# Patient Record
Sex: MALE | Race: WHITE | NOT HISPANIC OR LATINO | Employment: UNEMPLOYED | URBAN - NONMETROPOLITAN AREA
[De-identification: names, ages, dates, MRNs, and addresses within clinical notes are randomized per-mention and may not be internally consistent; named-entity substitution may affect disease eponyms.]

---

## 2017-03-07 ENCOUNTER — HOSPITAL ENCOUNTER (EMERGENCY)
Facility: HOSPITAL | Age: 22
Discharge: LEFT BEFORE DISCHARGE | End: 2017-03-07

## 2017-03-07 VITALS
SYSTOLIC BLOOD PRESSURE: 125 MMHG | HEART RATE: 89 BPM | OXYGEN SATURATION: 100 % | HEIGHT: 69 IN | WEIGHT: 120 LBS | TEMPERATURE: 98 F | RESPIRATION RATE: 18 BRPM | DIASTOLIC BLOOD PRESSURE: 92 MMHG | BODY MASS INDEX: 17.77 KG/M2

## 2017-03-07 PROCEDURE — 99211 OFF/OP EST MAY X REQ PHY/QHP: CPT

## 2017-03-07 NOTE — ED NOTES
Pt notified staff that he did not wish to wait any longer and signed out without being seen.      Adelita Echevarria, VIC  03/07/17 2931

## 2017-04-25 ENCOUNTER — TRANSCRIBE ORDERS (OUTPATIENT)
Dept: ADMINISTRATIVE | Facility: HOSPITAL | Age: 22
End: 2017-04-25

## 2017-04-25 DIAGNOSIS — B18.2 CHRONIC HEPATITIS C WITHOUT HEPATIC COMA (HCC): Primary | ICD-10-CM

## 2017-05-02 ENCOUNTER — HOSPITAL ENCOUNTER (OUTPATIENT)
Dept: ULTRASOUND IMAGING | Facility: HOSPITAL | Age: 22
Discharge: HOME | End: 2017-05-02
Attending: INTERNAL MEDICINE | Admitting: INTERNAL MEDICINE

## 2017-05-02 DIAGNOSIS — B18.2 CHRONIC HEPATITIS C WITHOUT HEPATIC COMA (HCC): ICD-10-CM

## 2017-05-02 PROCEDURE — 76700 US EXAM ABDOM COMPLETE: CPT

## 2017-05-02 PROCEDURE — 76700 US EXAM ABDOM COMPLETE: CPT | Performed by: RADIOLOGY

## 2017-12-28 ENCOUNTER — HOSPITAL ENCOUNTER (EMERGENCY)
Facility: HOSPITAL | Age: 22
Discharge: PSYCHIATRIC OR SUBSTANCE USE FACILITY | End: 2017-12-28
Attending: EMERGENCY MEDICINE | Admitting: EMERGENCY MEDICINE

## 2017-12-28 ENCOUNTER — HOSPITAL ENCOUNTER (INPATIENT)
Facility: HOSPITAL | Age: 22
LOS: 1 days | Discharge: LEFT BEFORE DISCHARGE | End: 2017-12-28
Attending: PSYCHIATRY & NEUROLOGY | Admitting: PSYCHIATRY & NEUROLOGY

## 2017-12-28 VITALS
DIASTOLIC BLOOD PRESSURE: 74 MMHG | HEIGHT: 66 IN | HEART RATE: 108 BPM | RESPIRATION RATE: 18 BRPM | BODY MASS INDEX: 17.68 KG/M2 | OXYGEN SATURATION: 100 % | SYSTOLIC BLOOD PRESSURE: 116 MMHG | TEMPERATURE: 98.4 F | WEIGHT: 110 LBS

## 2017-12-28 VITALS
HEART RATE: 82 BPM | BODY MASS INDEX: 17.36 KG/M2 | WEIGHT: 108 LBS | RESPIRATION RATE: 18 BRPM | OXYGEN SATURATION: 99 % | DIASTOLIC BLOOD PRESSURE: 54 MMHG | HEIGHT: 66 IN | TEMPERATURE: 98.6 F | SYSTOLIC BLOOD PRESSURE: 98 MMHG

## 2017-12-28 DIAGNOSIS — F19.10 SUBSTANCE ABUSE (HCC): Primary | ICD-10-CM

## 2017-12-28 LAB
6-ACETYL MORPHINE: NEGATIVE
ALBUMIN SERPL-MCNC: 4.5 G/DL (ref 3.5–5)
ALBUMIN/GLOB SERPL: 1.3 G/DL (ref 1.5–2.5)
ALP SERPL-CCNC: 64 U/L (ref 40–129)
ALT SERPL W P-5'-P-CCNC: 97 U/L (ref 10–44)
AMPHET+METHAMPHET UR QL: POSITIVE
ANION GAP SERPL CALCULATED.3IONS-SCNC: 4.5 MMOL/L (ref 3.6–11.2)
AST SERPL-CCNC: 59 U/L (ref 10–34)
BARBITURATES UR QL SCN: NEGATIVE
BASOPHILS # BLD AUTO: 0.02 10*3/MM3 (ref 0–0.3)
BASOPHILS NFR BLD AUTO: 0.2 % (ref 0–2)
BENZODIAZ UR QL SCN: NEGATIVE
BILIRUB SERPL-MCNC: 1.6 MG/DL (ref 0.2–1.8)
BILIRUB UR QL STRIP: ABNORMAL
BUN BLD-MCNC: 15 MG/DL (ref 7–21)
BUN/CREAT SERPL: 19.7 (ref 7–25)
BUPRENORPHINE SERPL-MCNC: POSITIVE NG/ML
CALCIUM SPEC-SCNC: 10 MG/DL (ref 7.7–10)
CANNABINOIDS SERPL QL: NEGATIVE
CHLORIDE SERPL-SCNC: 107 MMOL/L (ref 99–112)
CLARITY UR: CLEAR
CO2 SERPL-SCNC: 26.5 MMOL/L (ref 24.3–31.9)
COCAINE UR QL: NEGATIVE
COLOR UR: ABNORMAL
CREAT BLD-MCNC: 0.76 MG/DL (ref 0.43–1.29)
DEPRECATED RDW RBC AUTO: 39.2 FL (ref 37–54)
EOSINOPHIL # BLD AUTO: 0.11 10*3/MM3 (ref 0–0.7)
EOSINOPHIL NFR BLD AUTO: 1.3 % (ref 0–5)
ERYTHROCYTE [DISTWIDTH] IN BLOOD BY AUTOMATED COUNT: 12.9 % (ref 11.5–14.5)
ETHANOL BLD-MCNC: <10 MG/DL
ETHANOL UR QL: <0.01 %
GFR SERPL CREATININE-BSD FRML MDRD: 129 ML/MIN/1.73
GLOBULIN UR ELPH-MCNC: 3.6 GM/DL
GLUCOSE BLD-MCNC: 106 MG/DL (ref 70–110)
GLUCOSE UR STRIP-MCNC: NEGATIVE MG/DL
HCT VFR BLD AUTO: 40.1 % (ref 42–52)
HGB BLD-MCNC: 13.5 G/DL (ref 14–18)
HGB UR QL STRIP.AUTO: NEGATIVE
IMM GRANULOCYTES # BLD: 0.01 10*3/MM3 (ref 0–0.03)
IMM GRANULOCYTES NFR BLD: 0.1 % (ref 0–0.5)
KETONES UR QL STRIP: ABNORMAL
LEUKOCYTE ESTERASE UR QL STRIP.AUTO: NEGATIVE
LYMPHOCYTES # BLD AUTO: 2.7 10*3/MM3 (ref 1–3)
LYMPHOCYTES NFR BLD AUTO: 31.2 % (ref 21–51)
MCH RBC QN AUTO: 28.4 PG (ref 27–33)
MCHC RBC AUTO-ENTMCNC: 33.7 G/DL (ref 33–37)
MCV RBC AUTO: 84.4 FL (ref 80–94)
METHADONE UR QL SCN: NEGATIVE
MONOCYTES # BLD AUTO: 0.78 10*3/MM3 (ref 0.1–0.9)
MONOCYTES NFR BLD AUTO: 9 % (ref 0–10)
NEUTROPHILS # BLD AUTO: 5.03 10*3/MM3 (ref 1.4–6.5)
NEUTROPHILS NFR BLD AUTO: 58.2 % (ref 30–70)
NITRITE UR QL STRIP: NEGATIVE
OPIATES UR QL: NEGATIVE
OSMOLALITY SERPL CALC.SUM OF ELEC: 276.9 MOSM/KG (ref 273–305)
OXYCODONE UR QL SCN: NEGATIVE
PCP UR QL SCN: NEGATIVE
PH UR STRIP.AUTO: <=5 [PH] (ref 5–8)
PLATELET # BLD AUTO: 314 10*3/MM3 (ref 130–400)
PMV BLD AUTO: 10.7 FL (ref 6–10)
POTASSIUM BLD-SCNC: 4.2 MMOL/L (ref 3.5–5.3)
PROT SERPL-MCNC: 8.1 G/DL (ref 6–8)
PROT UR QL STRIP: NEGATIVE
RBC # BLD AUTO: 4.75 10*6/MM3 (ref 4.7–6.1)
SODIUM BLD-SCNC: 138 MMOL/L (ref 135–153)
SP GR UR STRIP: >1.03 (ref 1–1.03)
UROBILINOGEN UR QL STRIP: ABNORMAL
WBC NRBC COR # BLD: 8.65 10*3/MM3 (ref 4.5–12.5)

## 2017-12-28 PROCEDURE — 93005 ELECTROCARDIOGRAM TRACING: CPT | Performed by: PSYCHIATRY & NEUROLOGY

## 2017-12-28 PROCEDURE — 80307 DRUG TEST PRSMV CHEM ANLYZR: CPT | Performed by: PHYSICIAN ASSISTANT

## 2017-12-28 PROCEDURE — 93010 ELECTROCARDIOGRAM REPORT: CPT | Performed by: INTERNAL MEDICINE

## 2017-12-28 PROCEDURE — 80053 COMPREHEN METABOLIC PANEL: CPT | Performed by: PHYSICIAN ASSISTANT

## 2017-12-28 PROCEDURE — 99284 EMERGENCY DEPT VISIT MOD MDM: CPT

## 2017-12-28 PROCEDURE — 81003 URINALYSIS AUTO W/O SCOPE: CPT | Performed by: PHYSICIAN ASSISTANT

## 2017-12-28 PROCEDURE — 85025 COMPLETE CBC W/AUTO DIFF WBC: CPT | Performed by: PHYSICIAN ASSISTANT

## 2017-12-28 RX ORDER — LOPERAMIDE HYDROCHLORIDE 2 MG/1
2 CAPSULE ORAL 4 TIMES DAILY PRN
Status: DISCONTINUED | OUTPATIENT
Start: 2017-12-28 | End: 2017-12-29 | Stop reason: HOSPADM

## 2017-12-28 RX ORDER — CLONIDINE HYDROCHLORIDE 0.1 MG/1
0.1 TABLET ORAL 3 TIMES DAILY PRN
Status: DISCONTINUED | OUTPATIENT
Start: 2017-12-30 | End: 2017-12-29 | Stop reason: HOSPADM

## 2017-12-28 RX ORDER — ALUMINA, MAGNESIA, AND SIMETHICONE 2400; 2400; 240 MG/30ML; MG/30ML; MG/30ML
15 SUSPENSION ORAL EVERY 6 HOURS PRN
Status: DISCONTINUED | OUTPATIENT
Start: 2017-12-28 | End: 2017-12-29 | Stop reason: HOSPADM

## 2017-12-28 RX ORDER — NICOTINE 21 MG/24HR
1 PATCH, TRANSDERMAL 24 HOURS TRANSDERMAL DAILY
Status: DISCONTINUED | OUTPATIENT
Start: 2017-12-28 | End: 2017-12-29 | Stop reason: HOSPADM

## 2017-12-28 RX ORDER — CYCLOBENZAPRINE HCL 10 MG
10 TABLET ORAL 3 TIMES DAILY PRN
Status: DISCONTINUED | OUTPATIENT
Start: 2017-12-28 | End: 2017-12-29 | Stop reason: HOSPADM

## 2017-12-28 RX ORDER — TRAZODONE HYDROCHLORIDE 50 MG/1
50 TABLET ORAL NIGHTLY PRN
Status: DISCONTINUED | OUTPATIENT
Start: 2017-12-28 | End: 2017-12-29 | Stop reason: HOSPADM

## 2017-12-28 RX ORDER — CLONIDINE HYDROCHLORIDE 0.1 MG/1
0.1 TABLET ORAL ONCE AS NEEDED
Status: DISCONTINUED | OUTPATIENT
Start: 2018-01-01 | End: 2017-12-29 | Stop reason: HOSPADM

## 2017-12-28 RX ORDER — CLONIDINE HYDROCHLORIDE 0.1 MG/1
0.1 TABLET ORAL 4 TIMES DAILY PRN
Status: DISCONTINUED | OUTPATIENT
Start: 2017-12-29 | End: 2017-12-29 | Stop reason: HOSPADM

## 2017-12-28 RX ORDER — IBUPROFEN 600 MG/1
600 TABLET ORAL EVERY 6 HOURS PRN
Status: DISCONTINUED | OUTPATIENT
Start: 2017-12-28 | End: 2017-12-29 | Stop reason: HOSPADM

## 2017-12-28 RX ORDER — ECHINACEA PURPUREA EXTRACT 125 MG
2 TABLET ORAL AS NEEDED
Status: DISCONTINUED | OUTPATIENT
Start: 2017-12-28 | End: 2017-12-29 | Stop reason: HOSPADM

## 2017-12-28 RX ORDER — CLONIDINE HYDROCHLORIDE 0.1 MG/1
0.1 TABLET ORAL 2 TIMES DAILY PRN
Status: DISCONTINUED | OUTPATIENT
Start: 2017-12-31 | End: 2017-12-29 | Stop reason: HOSPADM

## 2017-12-28 RX ORDER — HYDROXYZINE 50 MG/1
50 TABLET, FILM COATED ORAL EVERY 4 HOURS PRN
Status: DISCONTINUED | OUTPATIENT
Start: 2017-12-28 | End: 2017-12-29 | Stop reason: HOSPADM

## 2017-12-28 RX ORDER — CLONIDINE HYDROCHLORIDE 0.1 MG/1
0.1 TABLET ORAL 4 TIMES DAILY PRN
Status: DISCONTINUED | OUTPATIENT
Start: 2017-12-28 | End: 2017-12-28

## 2017-12-28 RX ORDER — DICYCLOMINE HYDROCHLORIDE 10 MG/1
10 CAPSULE ORAL 3 TIMES DAILY PRN
Status: DISCONTINUED | OUTPATIENT
Start: 2017-12-28 | End: 2017-12-29 | Stop reason: HOSPADM

## 2017-12-28 RX ORDER — CLONIDINE HYDROCHLORIDE 0.1 MG/1
0.1 TABLET ORAL 4 TIMES DAILY PRN
Status: DISCONTINUED | OUTPATIENT
Start: 2017-12-28 | End: 2017-12-29 | Stop reason: HOSPADM

## 2017-12-28 RX ORDER — FAMOTIDINE 20 MG/1
20 TABLET, FILM COATED ORAL 2 TIMES DAILY PRN
Status: DISCONTINUED | OUTPATIENT
Start: 2017-12-28 | End: 2017-12-29 | Stop reason: HOSPADM

## 2017-12-28 RX ORDER — ONDANSETRON 4 MG/1
4 TABLET, FILM COATED ORAL EVERY 6 HOURS PRN
Status: DISCONTINUED | OUTPATIENT
Start: 2017-12-28 | End: 2017-12-29 | Stop reason: HOSPADM

## 2017-12-28 RX ORDER — BENZONATATE 100 MG/1
100 CAPSULE ORAL 3 TIMES DAILY PRN
Status: DISCONTINUED | OUTPATIENT
Start: 2017-12-28 | End: 2017-12-29 | Stop reason: HOSPADM

## 2018-01-05 NOTE — H&P
INITIAL PSYCHIATRIC HISTORY & PHYSICAL    Patient Identification:  Name:  Korey Sierra  Age:  22 y.o.  Sex:  male  :  1995  MRN:  [id number]   Visit Number:  [id number]  Primary Care Physician:  DEMARCO Ruiz    SUBJECTIVE    CC: withdrawals    HPI: Korey Sierra is a 22 y.o. male who was admitted on 2017 with complaints of withdrawals from opioids and was using Suboxone interavenously. He was admitted to the detox recovery unit and started on Clonidine detox. He decided to leave the hospital prior to being seen by the psychiatrist and Dr. Kendall Snow was on call and he gave orders for his discharge.      Past Medical History:   Diagnosis Date   • ADHD (attention deficit hyperactivity disorder)    • Hepatitis C    • Substance abuse    • Withdrawal symptoms, drug or narcotic           Past Surgical History:   Procedure Laterality Date   • NO PAST SURGERIES         Family History   Problem Relation Age of Onset   • Drug abuse Paternal Uncle          No prescriptions prior to admission.         ALLERGIES:  Review of patient's allergies indicates no known allergies.         REVIEW OF SYSTEMS:  Review of Systems   Not done, please see ER notes.  OBJECTIVE    PHYSICAL EXAM:  Physical Exam   Not done, please see ER notes.    MENTAL STATUS EXAM:    Not done, please see ER notes.      Imaging Results (last 24 hours)     ** No results found for the last 24 hours. **           ECG/EMG Results (most recent)     Procedure Component Value Units Date/Time    ECG 12 Lead [[id number]] Collected:  17 1608     Updated:  17    Narrative:       Test Reason : Potential adverse reaction to medications.  Blood Pressure : **/** mmHG  Vent. Rate : 065 BPM     Atrial Rate : 065 BPM     P-R Int : 122 ms          QRS Dur : 110 ms      QT Int : 416 ms       P-R-T Axes : 044 064 038 degrees     QTc Int : 432 ms    Normal sinus rhythm with sinus arrhythmia  RSR' or QR pattern in V1 suggests right  ventricular conduction delay  Borderline ECG  No previous ECGs available  Confirmed by Joshua Shelton ([id number]) on 12/28/2017 10:19:30 PM    Referred By:  DEBORAH           Confirmed By:Joshua Shelton           Lab Results   Component Value Date    GLUCOSE 106 12/28/2017    BUN 15 12/28/2017    CREATININE 0.76 12/28/2017    EGFRIFNONA 129 12/28/2017    BCR 19.7 12/28/2017    CO2 26.5 12/28/2017    CALCIUM 10.0 12/28/2017    ALBUMIN 4.50 12/28/2017    LABIL2 1.3 (L) 12/28/2017    AST 59 (H) 12/28/2017    ALT 97 (H) 12/28/2017       Lab Results   Component Value Date    WBC 8.65 12/28/2017    HGB 13.5 (L) 12/28/2017    HCT 40.1 (L) 12/28/2017    MCV 84.4 12/28/2017     12/28/2017       Last Urine Toxicity     LAST URINE TOXICITY RESULTS Latest Ref Rng & Units 12/28/2017 3/21/2015    AMPHETAMINES SCREEN, URINE Negative Positive(A) Negative    BARBITURATES SCREEN Negative Negative Negative    BENZODIAZEPINE SCREEN, URINE Negative Negative Negative    BUPRENORPHINE Negative Positive(A) -    COCAINE SCREEN, URINE Negative Negative Negative    METHADONE SCREEN, URINE Negative Negative Negative          Brief Urine Lab Results  (Last result in the past 365 days)      Color   Clarity   Blood   Leuk Est   Nitrite   Protein   CREAT   Urine HCG        12/28/17 1501 Dark Yellow(A) Clear Negative Negative Negative Negative                   ASSESSMENT & PLAN:      Patient Active Problem List   Diagnosis Code   • Polysubstance abuse F19.10         The patient was admitted for detox treatment and was started on Clonidine detox but shortly afterwards he decided to leave and purse his treatment somewhere else and wanted to leave [place] and was discharged by Dr. Kendall Snow.

## 2018-01-17 NOTE — DISCHARGE SUMMARY
Date of Discharge:  1/16/2018    Discharge Diagnosis:Active Problems:    Polysubstance abuse        Presenting Problem/History of Present Illness:  22 y.o. male who was admitted on 12/28/2017 with complaints of withdrawals from opioids and was using Suboxone interavenously.     Hospital Course:  Patient was admitted for safety and stabilization and was placed on standard precautions.  Routine labs were checked.  Patient was assigned a masters level therapist and provided with an opportunity to participate in group and individual therapy on the unit.  Unfortunately, patient decided to leave the hospital within hours of being admitted.   At that time his vitals were stable. He was not suicidal or homicidal. He did not meet criteria for forced hospitalization against his will and left against medical advise.    Consults:   Consults     No orders found from 11/29/2017 to 12/29/2017.          Labs:  Lab Results (all)     None          Imaging:  Imaging Results (all)     None                  Condition on Discharge: gaurded      Discharge Disposition  Left Against Medical Advice    Discharge Medications  There are no discharge medications for this patient.       Discharge Diet: regular    Activity at Discharge: as tolerated    Follow-up Appointments:    No future appointments.      Kendall Snow MD  01/16/18  7:38 PM

## 2018-06-10 ENCOUNTER — HOSPITAL ENCOUNTER (EMERGENCY)
Facility: HOSPITAL | Age: 23
Discharge: HOME | End: 2018-06-10
Attending: FAMILY MEDICINE | Admitting: FAMILY MEDICINE

## 2018-06-10 ENCOUNTER — APPOINTMENT (OUTPATIENT)
Dept: CT IMAGING | Facility: HOSPITAL | Age: 23
End: 2018-06-10

## 2018-06-10 VITALS
BODY MASS INDEX: 19.7 KG/M2 | HEART RATE: 75 BPM | DIASTOLIC BLOOD PRESSURE: 55 MMHG | OXYGEN SATURATION: 100 % | SYSTOLIC BLOOD PRESSURE: 105 MMHG | TEMPERATURE: 98.2 F | WEIGHT: 130 LBS | HEIGHT: 68 IN | RESPIRATION RATE: 20 BRPM

## 2018-06-10 DIAGNOSIS — R56.9 SEIZURE (HCC): Primary | ICD-10-CM

## 2018-06-10 DIAGNOSIS — H70.91 MASTOIDITIS OF RIGHT SIDE: ICD-10-CM

## 2018-06-10 LAB
6-ACETYL MORPHINE: NEGATIVE
ALBUMIN SERPL-MCNC: 5.1 G/DL (ref 3.5–5)
ALBUMIN/GLOB SERPL: 1.4 G/DL (ref 1.5–2.5)
ALP SERPL-CCNC: 58 U/L (ref 40–129)
ALT SERPL W P-5'-P-CCNC: 128 U/L (ref 10–44)
AMPHET+METHAMPHET UR QL: POSITIVE
ANION GAP SERPL CALCULATED.3IONS-SCNC: 11.5 MMOL/L (ref 3.6–11.2)
AST SERPL-CCNC: 80 U/L (ref 10–34)
BARBITURATES UR QL SCN: NEGATIVE
BASOPHILS # BLD AUTO: 0.01 10*3/MM3 (ref 0–0.3)
BASOPHILS NFR BLD AUTO: 0.1 % (ref 0–2)
BENZODIAZ UR QL SCN: NEGATIVE
BILIRUB SERPL-MCNC: 0.6 MG/DL (ref 0.2–1.8)
BILIRUB UR QL STRIP: NEGATIVE
BUN BLD-MCNC: 6 MG/DL (ref 7–21)
BUN/CREAT SERPL: 6.7 (ref 7–25)
BUPRENORPHINE SERPL-MCNC: NEGATIVE NG/ML
CALCIUM SPEC-SCNC: 10 MG/DL (ref 7.7–10)
CANNABINOIDS SERPL QL: NEGATIVE
CHLORIDE SERPL-SCNC: 106 MMOL/L (ref 99–112)
CLARITY UR: ABNORMAL
CO2 SERPL-SCNC: 21.5 MMOL/L (ref 24.3–31.9)
COCAINE UR QL: NEGATIVE
COLOR UR: YELLOW
CREAT BLD-MCNC: 0.9 MG/DL (ref 0.43–1.29)
DEPRECATED RDW RBC AUTO: 41.7 FL (ref 37–54)
EOSINOPHIL # BLD AUTO: 0 10*3/MM3 (ref 0–0.7)
EOSINOPHIL NFR BLD AUTO: 0 % (ref 0–5)
ERYTHROCYTE [DISTWIDTH] IN BLOOD BY AUTOMATED COUNT: 13.6 % (ref 11.5–14.5)
ETHANOL BLD-MCNC: <10 MG/DL
ETHANOL UR QL: <0.01 %
GFR SERPL CREATININE-BSD FRML MDRD: 107 ML/MIN/1.73
GLOBULIN UR ELPH-MCNC: 3.7 GM/DL
GLUCOSE BLD-MCNC: 107 MG/DL (ref 70–110)
GLUCOSE UR STRIP-MCNC: NEGATIVE MG/DL
HCT VFR BLD AUTO: 43.1 % (ref 42–52)
HGB BLD-MCNC: 14.6 G/DL (ref 14–18)
HGB UR QL STRIP.AUTO: NEGATIVE
IMM GRANULOCYTES # BLD: 0.05 10*3/MM3 (ref 0–0.03)
IMM GRANULOCYTES NFR BLD: 0.4 % (ref 0–0.5)
KETONES UR QL STRIP: ABNORMAL
LEUKOCYTE ESTERASE UR QL STRIP.AUTO: NEGATIVE
LYMPHOCYTES # BLD AUTO: 1.53 10*3/MM3 (ref 1–3)
LYMPHOCYTES NFR BLD AUTO: 13.4 % (ref 21–51)
MCH RBC QN AUTO: 28.9 PG (ref 27–33)
MCHC RBC AUTO-ENTMCNC: 33.9 G/DL (ref 33–37)
MCV RBC AUTO: 85.3 FL (ref 80–94)
METHADONE UR QL SCN: POSITIVE
MONOCYTES # BLD AUTO: 0.47 10*3/MM3 (ref 0.1–0.9)
MONOCYTES NFR BLD AUTO: 4.1 % (ref 0–10)
NEUTROPHILS # BLD AUTO: 9.34 10*3/MM3 (ref 1.4–6.5)
NEUTROPHILS NFR BLD AUTO: 82 % (ref 30–70)
NITRITE UR QL STRIP: NEGATIVE
OPIATES UR QL: NEGATIVE
OSMOLALITY SERPL CALC.SUM OF ELEC: 275.6 MOSM/KG (ref 273–305)
OXYCODONE UR QL SCN: NEGATIVE
PCP UR QL SCN: NEGATIVE
PH UR STRIP.AUTO: <=5 [PH] (ref 5–8)
PLATELET # BLD AUTO: 289 10*3/MM3 (ref 130–400)
PMV BLD AUTO: 12.1 FL (ref 6–10)
POTASSIUM BLD-SCNC: 5.3 MMOL/L (ref 3.5–5.3)
PROT SERPL-MCNC: 8.8 G/DL (ref 6–8)
PROT UR QL STRIP: NEGATIVE
RBC # BLD AUTO: 5.05 10*6/MM3 (ref 4.7–6.1)
SODIUM BLD-SCNC: 139 MMOL/L (ref 135–153)
SP GR UR STRIP: 1.02 (ref 1–1.03)
UROBILINOGEN UR QL STRIP: ABNORMAL
WBC NRBC COR # BLD: 11.4 10*3/MM3 (ref 4.5–12.5)

## 2018-06-10 PROCEDURE — 70486 CT MAXILLOFACIAL W/O DYE: CPT | Performed by: RADIOLOGY

## 2018-06-10 PROCEDURE — 80307 DRUG TEST PRSMV CHEM ANLYZR: CPT | Performed by: PHYSICIAN ASSISTANT

## 2018-06-10 PROCEDURE — 99285 EMERGENCY DEPT VISIT HI MDM: CPT

## 2018-06-10 PROCEDURE — 96374 THER/PROPH/DIAG INJ IV PUSH: CPT

## 2018-06-10 PROCEDURE — 25010000002 LORAZEPAM PER 2 MG: Performed by: FAMILY MEDICINE

## 2018-06-10 PROCEDURE — 70450 CT HEAD/BRAIN W/O DYE: CPT | Performed by: RADIOLOGY

## 2018-06-10 PROCEDURE — 81003 URINALYSIS AUTO W/O SCOPE: CPT | Performed by: PHYSICIAN ASSISTANT

## 2018-06-10 PROCEDURE — 70450 CT HEAD/BRAIN W/O DYE: CPT

## 2018-06-10 PROCEDURE — 70486 CT MAXILLOFACIAL W/O DYE: CPT

## 2018-06-10 PROCEDURE — 85025 COMPLETE CBC W/AUTO DIFF WBC: CPT | Performed by: PHYSICIAN ASSISTANT

## 2018-06-10 PROCEDURE — 80053 COMPREHEN METABOLIC PANEL: CPT | Performed by: PHYSICIAN ASSISTANT

## 2018-06-10 RX ORDER — SODIUM CHLORIDE 0.9 % (FLUSH) 0.9 %
10 SYRINGE (ML) INJECTION AS NEEDED
Status: DISCONTINUED | OUTPATIENT
Start: 2018-06-10 | End: 2018-06-10 | Stop reason: HOSPADM

## 2018-06-10 RX ORDER — LORAZEPAM 2 MG/ML
1 INJECTION INTRAMUSCULAR ONCE
Status: COMPLETED | OUTPATIENT
Start: 2018-06-10 | End: 2018-06-10

## 2018-06-10 RX ORDER — AMOXICILLIN AND CLAVULANATE POTASSIUM 875; 125 MG/1; MG/1
1 TABLET, FILM COATED ORAL EVERY 12 HOURS SCHEDULED
Qty: 20 TABLET | Refills: 0 | Status: ON HOLD | OUTPATIENT
Start: 2018-06-10 | End: 2018-11-27

## 2018-06-10 RX ADMIN — LORAZEPAM 1 MG: 2 INJECTION INTRAMUSCULAR; INTRAVENOUS at 08:28

## 2018-06-10 NOTE — ED PROVIDER NOTES
"Subjective   Patient reports that he has been abusing Neurontin recently.  He took 18 yesterday though states he takes several each day.  He is not taking any today.  He also missed going to the methadone clinic yesterday        Seizures   Seizure activity on arrival: no    Seizure type:  Grand mal  Initial focality:  Diffuse  Episode characteristics: disorientation, generalized shaking and unresponsiveness    Return to baseline: yes    Severity:  Severe  Duration:  5 minutes  Timing:  Once  Number of seizures this episode:  1  Progression:  Resolved  Context comment:  Patient's mother reports that she heard a banging noise.  When she arrived into the kitchen her son and fallen onto the floor and was jerking all over.  He also had \"foam\" coming from his mouth.  He did strike the left side of his head.    PTA treatment:  None  History of seizures: no        Review of Systems   Constitutional: Negative.  Negative for fever.   HENT: Negative.    Respiratory: Negative.    Cardiovascular: Negative.  Negative for chest pain.   Gastrointestinal: Negative.  Negative for abdominal pain.   Endocrine: Negative.    Genitourinary: Negative.  Negative for dysuria.   Skin: Negative.    Neurological: Positive for seizures.   Psychiatric/Behavioral: Negative.    All other systems reviewed and are negative.      Past Medical History:   Diagnosis Date   • ADHD (attention deficit hyperactivity disorder)    • Hepatitis C    • Substance abuse    • Withdrawal symptoms, drug or narcotic        No Known Allergies    Past Surgical History:   Procedure Laterality Date   • NO PAST SURGERIES         Family History   Problem Relation Age of Onset   • Drug abuse Paternal Uncle        Social History     Social History   • Marital status: Single     Social History Main Topics   • Smoking status: Current Every Day Smoker     Packs/day: 1.00     Years: 9.00     Types: Cigarettes   • Smokeless tobacco: Never Used   • Alcohol use No   • Drug use: Yes "      Comment: suboxone   • Sexual activity: Defer     Other Topics Concern   • Not on file           Objective   Physical Exam   Constitutional: He is oriented to person, place, and time. He appears well-developed and well-nourished. No distress.   HENT:   Head: Normocephalic and atraumatic.   Right Ear: External ear normal.   Left Ear: External ear normal.   Nose: Nose normal.   Eyes: Conjunctivae and EOM are normal. Pupils are equal, round, and reactive to light.   Neck: Normal range of motion. Neck supple. No JVD present. No tracheal deviation present.   Cardiovascular: Normal rate, regular rhythm and normal heart sounds.    No murmur heard.  Pulmonary/Chest: Effort normal and breath sounds normal. No respiratory distress. He has no wheezes.   Abdominal: Soft. Bowel sounds are normal. There is no tenderness.   Musculoskeletal: Normal range of motion. He exhibits no edema or deformity.   Neurological: He is alert and oriented to person, place, and time. No cranial nerve deficit.   Patient does have occasional twitching in both arms and face.  He has equal  and strength.   Skin: Skin is warm and dry. No rash noted. He is not diaphoretic. No erythema. No pallor.   Patient has some abrasions to the left side of his face.    Psychiatric: He has a normal mood and affect. His behavior is normal. Thought content normal.   Nursing note and vitals reviewed.      Procedures           ED Course  ED Course as of Carlos 10 1026   Sun Carlos 10, 2018   0950 Patient's twitching has resolved.  He is alert and oriented ×3.  He was counseled about his CT findings of potential mastoiditis.  He denies any recent sinus issues.  No ear pain.  His postauricular region is normal nontender not red.  His ear exam is also normal.  He was counseled about the need to stop using Neurontin.  His amphetamine screen was also positive.  He states that he's not been using amphetamine.  He does request a refill for methadone and is concerned about  smoking.  He's been counseled about signs and symptoms worsening along with appropriate follow-up.  He is alert and oriented ×3 at this time.  No distress. D/w Dr Hills- agrees with plan  [JI]      ED Course User Index  [JI] ERIK Salazar                  MDM  Number of Diagnoses or Management Options  Mastoiditis of right side: new and requires workup  Seizure: new and requires workup     Amount and/or Complexity of Data Reviewed  Clinical lab tests: reviewed and ordered  Tests in the radiology section of CPT®: reviewed and ordered  Discuss the patient with other providers: yes    Risk of Complications, Morbidity, and/or Mortality  Presenting problems: moderate          Final diagnoses:   Seizure   Mastoiditis of right side            ERIK Salazar  06/10/18 1026

## 2018-11-27 ENCOUNTER — HOSPITAL ENCOUNTER (INPATIENT)
Facility: HOSPITAL | Age: 23
LOS: 6 days | Discharge: HOME | End: 2018-12-03
Attending: PSYCHIATRY & NEUROLOGY | Admitting: PSYCHIATRY & NEUROLOGY

## 2018-11-27 ENCOUNTER — HOSPITAL ENCOUNTER (EMERGENCY)
Facility: HOSPITAL | Age: 23
Discharge: STILL ADMITTED OR ADMINISTRATIVE | End: 2018-11-27
Attending: GENERAL ACUTE CARE HOSPITAL

## 2018-11-27 VITALS
RESPIRATION RATE: 16 BRPM | OXYGEN SATURATION: 99 % | WEIGHT: 126 LBS | BODY MASS INDEX: 19.1 KG/M2 | HEART RATE: 82 BPM | HEIGHT: 68 IN | DIASTOLIC BLOOD PRESSURE: 60 MMHG | SYSTOLIC BLOOD PRESSURE: 115 MMHG | TEMPERATURE: 97.7 F

## 2018-11-27 DIAGNOSIS — F19.10 SUBSTANCE ABUSE (HCC): Primary | ICD-10-CM

## 2018-11-27 LAB
6-ACETYL MORPHINE: NEGATIVE
ALBUMIN SERPL-MCNC: 4.1 G/DL (ref 3.5–5)
ALBUMIN/GLOB SERPL: 1.2 G/DL (ref 1.5–2.5)
ALP SERPL-CCNC: 72 U/L (ref 40–129)
ALT SERPL W P-5'-P-CCNC: 63 U/L (ref 10–44)
AMPHET+METHAMPHET UR QL: POSITIVE
ANION GAP SERPL CALCULATED.3IONS-SCNC: 5.6 MMOL/L (ref 3.6–11.2)
AST SERPL-CCNC: 38 U/L (ref 10–34)
BARBITURATES UR QL SCN: NEGATIVE
BASOPHILS # BLD AUTO: 0.03 10*3/MM3 (ref 0–0.3)
BASOPHILS NFR BLD AUTO: 0.4 % (ref 0–2)
BENZODIAZ UR QL SCN: NEGATIVE
BILIRUB SERPL-MCNC: 0.5 MG/DL (ref 0.2–1.8)
BILIRUB UR QL STRIP: NEGATIVE
BUN BLD-MCNC: 7 MG/DL (ref 7–21)
BUN/CREAT SERPL: 11.1 (ref 7–25)
BUPRENORPHINE SERPL-MCNC: POSITIVE NG/ML
CALCIUM SPEC-SCNC: 9.6 MG/DL (ref 7.7–10)
CANNABINOIDS SERPL QL: NEGATIVE
CHLORIDE SERPL-SCNC: 106 MMOL/L (ref 99–112)
CLARITY UR: CLEAR
CO2 SERPL-SCNC: 28.4 MMOL/L (ref 24.3–31.9)
COCAINE UR QL: NEGATIVE
COLOR UR: YELLOW
CREAT BLD-MCNC: 0.63 MG/DL (ref 0.43–1.29)
DEPRECATED RDW RBC AUTO: 38.8 FL (ref 37–54)
EOSINOPHIL # BLD AUTO: 0.11 10*3/MM3 (ref 0–0.7)
EOSINOPHIL NFR BLD AUTO: 1.5 % (ref 0–5)
ERYTHROCYTE [DISTWIDTH] IN BLOOD BY AUTOMATED COUNT: 12.6 % (ref 11.5–14.5)
ETHANOL BLD-MCNC: <10 MG/DL
ETHANOL UR QL: <0.01 %
GFR SERPL CREATININE-BSD FRML MDRD: >150 ML/MIN/1.73
GLOBULIN UR ELPH-MCNC: 3.5 GM/DL
GLUCOSE BLD-MCNC: 107 MG/DL (ref 70–110)
GLUCOSE UR STRIP-MCNC: NEGATIVE MG/DL
HCT VFR BLD AUTO: 40.8 % (ref 42–52)
HGB BLD-MCNC: 13.8 G/DL (ref 14–18)
HGB UR QL STRIP.AUTO: NEGATIVE
IMM GRANULOCYTES # BLD: 0.01 10*3/MM3 (ref 0–0.03)
IMM GRANULOCYTES NFR BLD: 0.1 % (ref 0–0.5)
KETONES UR QL STRIP: ABNORMAL
LEUKOCYTE ESTERASE UR QL STRIP.AUTO: NEGATIVE
LYMPHOCYTES # BLD AUTO: 3.47 10*3/MM3 (ref 1–3)
LYMPHOCYTES NFR BLD AUTO: 46.1 % (ref 21–51)
MCH RBC QN AUTO: 28.8 PG (ref 27–33)
MCHC RBC AUTO-ENTMCNC: 33.8 G/DL (ref 33–37)
MCV RBC AUTO: 85 FL (ref 80–94)
METHADONE UR QL SCN: NEGATIVE
MONOCYTES # BLD AUTO: 0.53 10*3/MM3 (ref 0.1–0.9)
MONOCYTES NFR BLD AUTO: 7 % (ref 0–10)
NEUTROPHILS # BLD AUTO: 3.37 10*3/MM3 (ref 1.4–6.5)
NEUTROPHILS NFR BLD AUTO: 44.9 % (ref 30–70)
NITRITE UR QL STRIP: NEGATIVE
OPIATES UR QL: NEGATIVE
OSMOLALITY SERPL CALC.SUM OF ELEC: 277.8 MOSM/KG (ref 273–305)
OXYCODONE UR QL SCN: NEGATIVE
PCP UR QL SCN: NEGATIVE
PH UR STRIP.AUTO: 6.5 [PH] (ref 5–8)
PLATELET # BLD AUTO: 306 10*3/MM3 (ref 130–400)
PMV BLD AUTO: 10.6 FL (ref 6–10)
POTASSIUM BLD-SCNC: 4 MMOL/L (ref 3.5–5.3)
PROT SERPL-MCNC: 7.6 G/DL (ref 6–8)
PROT UR QL STRIP: NEGATIVE
RBC # BLD AUTO: 4.8 10*6/MM3 (ref 4.7–6.1)
SODIUM BLD-SCNC: 140 MMOL/L (ref 135–153)
SP GR UR STRIP: 1.02 (ref 1–1.03)
UROBILINOGEN UR QL STRIP: ABNORMAL
WBC NRBC COR # BLD: 7.52 10*3/MM3 (ref 4.5–12.5)

## 2018-11-27 PROCEDURE — 80307 DRUG TEST PRSMV CHEM ANLYZR: CPT | Performed by: PHYSICIAN ASSISTANT

## 2018-11-27 PROCEDURE — 81003 URINALYSIS AUTO W/O SCOPE: CPT | Performed by: PHYSICIAN ASSISTANT

## 2018-11-27 PROCEDURE — 93010 ELECTROCARDIOGRAM REPORT: CPT | Performed by: INTERNAL MEDICINE

## 2018-11-27 PROCEDURE — HZ2ZZZZ DETOXIFICATION SERVICES FOR SUBSTANCE ABUSE TREATMENT: ICD-10-PCS | Performed by: PSYCHIATRY & NEUROLOGY

## 2018-11-27 PROCEDURE — 80053 COMPREHEN METABOLIC PANEL: CPT | Performed by: PHYSICIAN ASSISTANT

## 2018-11-27 PROCEDURE — 93005 ELECTROCARDIOGRAM TRACING: CPT | Performed by: PSYCHIATRY & NEUROLOGY

## 2018-11-27 PROCEDURE — 85025 COMPLETE CBC W/AUTO DIFF WBC: CPT | Performed by: PHYSICIAN ASSISTANT

## 2018-11-27 RX ORDER — CLONIDINE HYDROCHLORIDE 0.1 MG/1
0.1 TABLET ORAL 3 TIMES DAILY PRN
Status: ACTIVE | OUTPATIENT
Start: 2018-11-29 | End: 2018-11-30

## 2018-11-27 RX ORDER — DICYCLOMINE HYDROCHLORIDE 10 MG/1
10 CAPSULE ORAL 3 TIMES DAILY PRN
Status: DISPENSED | OUTPATIENT
Start: 2018-11-27 | End: 2018-12-02

## 2018-11-27 RX ORDER — CLONIDINE HYDROCHLORIDE 0.1 MG/1
0.1 TABLET ORAL 4 TIMES DAILY PRN
Status: ACTIVE | OUTPATIENT
Start: 2018-11-27 | End: 2018-11-28

## 2018-11-27 RX ORDER — BENZTROPINE MESYLATE 1 MG/1
1 TABLET ORAL DAILY PRN
Status: DISCONTINUED | OUTPATIENT
Start: 2018-11-27 | End: 2018-12-03 | Stop reason: HOSPADM

## 2018-11-27 RX ORDER — CYCLOBENZAPRINE HCL 10 MG
10 TABLET ORAL 3 TIMES DAILY PRN
Status: DISPENSED | OUTPATIENT
Start: 2018-11-27 | End: 2018-12-02

## 2018-11-27 RX ORDER — TRAZODONE HYDROCHLORIDE 50 MG/1
50 TABLET ORAL NIGHTLY PRN
Status: DISCONTINUED | OUTPATIENT
Start: 2018-11-27 | End: 2018-12-03 | Stop reason: HOSPADM

## 2018-11-27 RX ORDER — ECHINACEA PURPUREA EXTRACT 125 MG
2 TABLET ORAL AS NEEDED
Status: DISCONTINUED | OUTPATIENT
Start: 2018-11-27 | End: 2018-12-03 | Stop reason: HOSPADM

## 2018-11-27 RX ORDER — ONDANSETRON 4 MG/1
4 TABLET, FILM COATED ORAL 3 TIMES DAILY PRN
Status: DISPENSED | OUTPATIENT
Start: 2018-11-27 | End: 2018-12-02

## 2018-11-27 RX ORDER — BENZONATATE 100 MG/1
100 CAPSULE ORAL 3 TIMES DAILY PRN
Status: DISCONTINUED | OUTPATIENT
Start: 2018-11-27 | End: 2018-12-03 | Stop reason: HOSPADM

## 2018-11-27 RX ORDER — HYDROXYZINE 50 MG/1
50 TABLET, FILM COATED ORAL 3 TIMES DAILY PRN
Status: DISPENSED | OUTPATIENT
Start: 2018-11-27 | End: 2018-12-02

## 2018-11-27 RX ORDER — ALUMINA, MAGNESIA, AND SIMETHICONE 2400; 2400; 240 MG/30ML; MG/30ML; MG/30ML
15 SUSPENSION ORAL EVERY 6 HOURS PRN
Status: DISCONTINUED | OUTPATIENT
Start: 2018-11-27 | End: 2018-12-03 | Stop reason: HOSPADM

## 2018-11-27 RX ORDER — LOPERAMIDE HYDROCHLORIDE 2 MG/1
2 CAPSULE ORAL 4 TIMES DAILY PRN
Status: DISCONTINUED | OUTPATIENT
Start: 2018-11-27 | End: 2018-12-03 | Stop reason: HOSPADM

## 2018-11-27 RX ORDER — CLONIDINE HYDROCHLORIDE 0.1 MG/1
0.1 TABLET ORAL 4 TIMES DAILY PRN
Status: ACTIVE | OUTPATIENT
Start: 2018-11-28 | End: 2018-11-29

## 2018-11-27 RX ORDER — FAMOTIDINE 20 MG/1
20 TABLET, FILM COATED ORAL 2 TIMES DAILY PRN
Status: DISCONTINUED | OUTPATIENT
Start: 2018-11-27 | End: 2018-12-03 | Stop reason: HOSPADM

## 2018-11-27 RX ORDER — BENZTROPINE MESYLATE 1 MG/ML
0.5 INJECTION INTRAMUSCULAR; INTRAVENOUS DAILY PRN
Status: DISCONTINUED | OUTPATIENT
Start: 2018-11-27 | End: 2018-12-03 | Stop reason: HOSPADM

## 2018-11-27 RX ORDER — IBUPROFEN 600 MG/1
600 TABLET ORAL EVERY 6 HOURS PRN
Status: DISCONTINUED | OUTPATIENT
Start: 2018-11-27 | End: 2018-12-03 | Stop reason: HOSPADM

## 2018-11-27 RX ORDER — CLONIDINE HYDROCHLORIDE 0.1 MG/1
0.1 TABLET ORAL 2 TIMES DAILY PRN
Status: ACTIVE | OUTPATIENT
Start: 2018-11-30 | End: 2018-12-01

## 2018-11-27 RX ORDER — CLONIDINE HYDROCHLORIDE 0.1 MG/1
0.1 TABLET ORAL ONCE AS NEEDED
Status: ACTIVE | OUTPATIENT
Start: 2018-12-01 | End: 2018-12-02

## 2018-11-27 RX ADMIN — TRAZODONE HYDROCHLORIDE 50 MG: 50 TABLET ORAL at 21:20

## 2018-11-27 RX ADMIN — ONDANSETRON 4 MG: 4 TABLET, FILM COATED ORAL at 21:20

## 2018-11-27 RX ADMIN — HYDROXYZINE HYDROCHLORIDE 50 MG: 50 TABLET, FILM COATED ORAL at 21:20

## 2018-11-28 LAB
HAV IGM SERPL QL IA: ABNORMAL
HBV CORE IGM SERPL QL IA: ABNORMAL
HBV SURFACE AG SERPL QL IA: ABNORMAL
HCV AB SER DONR QL: REACTIVE
T4 FREE SERPL-MCNC: 1.21 NG/DL (ref 0.89–1.76)
TSH SERPL DL<=0.05 MIU/L-ACNC: 0.77 MIU/ML (ref 0.55–4.78)

## 2018-11-28 PROCEDURE — 84439 ASSAY OF FREE THYROXINE: CPT | Performed by: PSYCHIATRY & NEUROLOGY

## 2018-11-28 PROCEDURE — 84443 ASSAY THYROID STIM HORMONE: CPT | Performed by: PSYCHIATRY & NEUROLOGY

## 2018-11-28 PROCEDURE — 80074 ACUTE HEPATITIS PANEL: CPT | Performed by: PSYCHIATRY & NEUROLOGY

## 2018-11-28 PROCEDURE — 82652 VIT D 1 25-DIHYDROXY: CPT | Performed by: PSYCHIATRY & NEUROLOGY

## 2018-11-28 PROCEDURE — 99222 1ST HOSP IP/OBS MODERATE 55: CPT | Performed by: PSYCHIATRY & NEUROLOGY

## 2018-11-28 RX ORDER — DOXEPIN HYDROCHLORIDE 25 MG/1
25 CAPSULE ORAL NIGHTLY
Status: DISCONTINUED | OUTPATIENT
Start: 2018-11-28 | End: 2018-12-03 | Stop reason: HOSPADM

## 2018-11-28 RX ORDER — BUPRENORPHINE 2 MG/1
2 TABLET SUBLINGUAL DAILY
Status: COMPLETED | OUTPATIENT
Start: 2018-12-01 | End: 2018-12-01

## 2018-11-28 RX ORDER — BUPRENORPHINE 2 MG/1
2 TABLET SUBLINGUAL 3 TIMES DAILY
Status: DISPENSED | OUTPATIENT
Start: 2018-11-29 | End: 2018-11-30

## 2018-11-28 RX ORDER — BUPRENORPHINE 2 MG/1
2 TABLET SUBLINGUAL 4 TIMES DAILY
Status: DISPENSED | OUTPATIENT
Start: 2018-11-28 | End: 2018-11-29

## 2018-11-28 RX ORDER — BUPRENORPHINE 2 MG/1
2 TABLET SUBLINGUAL DAILY
Status: COMPLETED | OUTPATIENT
Start: 2018-11-30 | End: 2018-11-30

## 2018-11-28 RX ADMIN — BUPRENORPHINE HCL 2 MG: 2 TABLET SUBLINGUAL at 11:19

## 2018-11-28 RX ADMIN — BUPRENORPHINE HCL 2 MG: 2 TABLET SUBLINGUAL at 17:03

## 2018-11-28 RX ADMIN — DICYCLOMINE HYDROCHLORIDE 10 MG: 10 CAPSULE ORAL at 11:19

## 2018-11-28 RX ADMIN — SERTRALINE 50 MG: 50 TABLET, FILM COATED ORAL at 11:20

## 2018-11-28 RX ADMIN — ONDANSETRON 4 MG: 4 TABLET, FILM COATED ORAL at 11:19

## 2018-11-29 PROCEDURE — 99233 SBSQ HOSP IP/OBS HIGH 50: CPT | Performed by: PSYCHIATRY & NEUROLOGY

## 2018-11-29 RX ADMIN — BUPRENORPHINE HCL 2 MG: 2 TABLET SUBLINGUAL at 10:39

## 2018-11-29 RX ADMIN — SERTRALINE 50 MG: 50 TABLET, FILM COATED ORAL at 10:40

## 2018-11-29 RX ADMIN — IBUPROFEN 600 MG: 600 TABLET ORAL at 06:08

## 2018-11-29 RX ADMIN — DOXEPIN HYDROCHLORIDE 25 MG: 25 CAPSULE ORAL at 21:07

## 2018-11-29 RX ADMIN — BUPRENORPHINE HCL 2 MG: 2 TABLET SUBLINGUAL at 21:08

## 2018-11-30 LAB — 1,25(OH)2D3 SERPL-MCNC: 24 PG/ML (ref 19.9–79.3)

## 2018-11-30 PROCEDURE — 99232 SBSQ HOSP IP/OBS MODERATE 35: CPT | Performed by: PSYCHIATRY & NEUROLOGY

## 2018-11-30 RX ORDER — MIRTAZAPINE 15 MG/1
7.5 TABLET, FILM COATED ORAL NIGHTLY
Status: DISCONTINUED | OUTPATIENT
Start: 2018-11-30 | End: 2018-12-03 | Stop reason: HOSPADM

## 2018-11-30 RX ORDER — BUPRENORPHINE 2 MG/1
2 TABLET SUBLINGUAL DAILY
Status: COMPLETED | OUTPATIENT
Start: 2018-11-30 | End: 2018-11-30

## 2018-11-30 RX ORDER — BUPRENORPHINE 2 MG/1
2 TABLET SUBLINGUAL DAILY
Status: COMPLETED | OUTPATIENT
Start: 2018-12-02 | End: 2018-12-02

## 2018-11-30 RX ADMIN — MIRTAZAPINE 7.5 MG: 15 TABLET, FILM COATED ORAL at 20:48

## 2018-11-30 RX ADMIN — BUPRENORPHINE HCL 2 MG: 2 TABLET SUBLINGUAL at 18:38

## 2018-11-30 RX ADMIN — DOXEPIN HYDROCHLORIDE 25 MG: 25 CAPSULE ORAL at 20:48

## 2018-11-30 RX ADMIN — SERTRALINE 50 MG: 50 TABLET, FILM COATED ORAL at 08:46

## 2018-11-30 RX ADMIN — BUPRENORPHINE HCL 2 MG: 2 TABLET SUBLINGUAL at 08:47

## 2018-11-30 RX ADMIN — OFLOXACIN 50000 UNITS: 300 TABLET, COATED ORAL at 18:37

## 2018-12-01 PROCEDURE — 99232 SBSQ HOSP IP/OBS MODERATE 35: CPT | Performed by: PSYCHIATRY & NEUROLOGY

## 2018-12-01 RX ADMIN — ONDANSETRON 4 MG: 4 TABLET, FILM COATED ORAL at 20:10

## 2018-12-01 RX ADMIN — CYCLOBENZAPRINE HYDROCHLORIDE 10 MG: 10 TABLET, FILM COATED ORAL at 08:16

## 2018-12-01 RX ADMIN — MIRTAZAPINE 7.5 MG: 15 TABLET, FILM COATED ORAL at 20:10

## 2018-12-01 RX ADMIN — HYDROXYZINE HYDROCHLORIDE 50 MG: 50 TABLET, FILM COATED ORAL at 08:16

## 2018-12-01 RX ADMIN — BUPRENORPHINE HCL 2 MG: 2 TABLET SUBLINGUAL at 08:22

## 2018-12-01 RX ADMIN — DOXEPIN HYDROCHLORIDE 25 MG: 25 CAPSULE ORAL at 20:09

## 2018-12-01 RX ADMIN — ONDANSETRON 4 MG: 4 TABLET, FILM COATED ORAL at 08:16

## 2018-12-01 RX ADMIN — SERTRALINE 50 MG: 50 TABLET, FILM COATED ORAL at 08:16

## 2018-12-01 NOTE — PROGRESS NOTES
"INPATIENT PSYCHIATRIC PROGRESS NOTE    Name:  Korey Sierra  :  1995  MRN:  [id number]  Visit Number:  [id number]  Length of stay:  4    SUBJECTIVE  CC: \"Polysubstance detox\"    INTERVAL HISTORY:  Still has some leg cramps but overall feels good. No reported SI. No other concerns. Will be going to rehab on Monday.       Review of Systems   Constitutional: Negative for activity change, appetite change and fatigue.   HENT: Negative.    Respiratory: Negative.    Cardiovascular: Negative.    Gastrointestinal: Negative for nausea.        Abdominal cramps   Musculoskeletal: Positive for arthralgias.       OBJECTIVE    Temp:  [97.7 °F (36.5 °C)-98.1 °F (36.7 °C)] 98 °F (36.7 °C)  Heart Rate:  [66-96] 96  Resp:  [16-20] 20  BP: (102-138)/(50-82) 107/50    MENTAL STATUS EXAM:  Appearance:Casually dressed, good hygeine. Thin male  Cooperation:Cooperative  Psychomotor: restless, No EPS, No motor tics  Speech-normal rate, amount.  Mood \"pretty good\"\"   Affect-congruen  Thought Content-goal directed, no delusional material present  Thought process-linear, organized.  Suicidality: No SI  Homicidality: No HI  Perception: No AH/VH  Insight-poor  Judgement-fair    Lab Results (last 24 hours)     ** No results found for the last 24 hours. **             Imaging Results (last 24 hours)     ** No results found for the last 24 hours. **             ECG/EMG Results (most recent)     Procedure Component Value Units Date/Time    ECG 12 Lead [[id number]] Collected:  18 1358     Updated:  18 1637    Narrative:       Test Reason : Potential adverse reaction to medications.  Blood Pressure : **/** mmHG  Vent. Rate : 062 BPM     Atrial Rate : 062 BPM     P-R Int : 126 ms          QRS Dur : 098 ms      QT Int : 406 ms       P-R-T Axes : 040 069 058 degrees     QTc Int : 412 ms    Normal sinus rhythm  RSR' or QR pattern in V1 suggests right ventricular conduction delay  Borderline ECG  When compared with ECG of 28-DEC-2017 " 16:08,  No significant change was found  Confirmed by Maxim Porter () on 2018 4:36:54 PM    Referred By:  JAYCEE           Confirmed By:Maxim Porter           ALLERGIES: Patient has no known allergies.      Current Facility-Administered Medications:   •  aluminum-magnesium hydroxide-simethicone (MAALOX MAX) 400-400-40 MG/5ML suspension 15 mL, 15 mL, Oral, Q6H PRN, Flory Olson MD  •  benzonatate (TESSALON) capsule 100 mg, 100 mg, Oral, TID PRN, Flory Olson MD  •  benztropine (COGENTIN) tablet 1 mg, 1 mg, Oral, Daily PRN **OR** benztropine (COGENTIN) injection 0.5 mg, 0.5 mg, Intramuscular, Daily PRN, Flory Olson MD  •  [START ON 2018] buprenorphine (SUBUTEX) SL tablet 2 mg, 2 mg, Sublingual, Daily, Flory Olson MD  •  cholecalciferol (VITAMIN D3) capsule 50,000 Units, 50,000 Units, Oral, Weekly, Flory Olson MD, 50,000 Units at 18 1837  •  [] CloNIDine (CATAPRES) tablet 0.1 mg, 0.1 mg, Oral, 4x Daily PRN **FOLLOWED BY** [] CloNIDine (CATAPRES) tablet 0.1 mg, 0.1 mg, Oral, TID PRN **FOLLOWED BY** [] CloNIDine (CATAPRES) tablet 0.1 mg, 0.1 mg, Oral, BID PRN **FOLLOWED BY** CloNIDine (CATAPRES) tablet 0.1 mg, 0.1 mg, Oral, Once PRN, Flory Olson MD  •  cyclobenzaprine (FLEXERIL) tablet 10 mg, 10 mg, Oral, TID PRN, Flory Olson MD, 10 mg at 18 0816  •  dicyclomine (BENTYL) capsule 10 mg, 10 mg, Oral, TID PRN, Flory Olson MD, 10 mg at 18 1119  •  doxepin (SINEquan) capsule 25 mg, 25 mg, Oral, Nightly, Flory Olson MD, 25 mg at 18  •  famotidine (PEPCID) tablet 20 mg, 20 mg, Oral, BID PRN, Flory Olson MD  •  hydrOXYzine (ATARAX) tablet 50 mg, 50 mg, Oral, TID PRN, Flory Olson MD, 50 mg at 18 0816  •  ibuprofen (ADVIL,MOTRIN) tablet 600 mg, 600 mg, Oral, Q6H PRN, Flory Olson MD, 600 mg at 18 0608  •  loperamide (IMODIUM) capsule 2 mg, 2 mg, Oral, 4x Daily PRN, Flory Olson MD  •  magnesium  hydroxide (MILK OF MAGNESIA) suspension 2400 mg/10mL 10 mL, 10 mL, Oral, Daily PRN, Flory Olson MD  •  mirtazapine (REMERON) tablet 7.5 mg, 7.5 mg, Oral, Nightly, Flory Olson MD, 7.5 mg at 11/30/18 2048  •  ondansetron (ZOFRAN) tablet 4 mg, 4 mg, Oral, TID PRN, Flory Olson MD, 4 mg at 12/01/18 0816  •  sertraline (ZOLOFT) tablet 50 mg, 50 mg, Oral, Daily, Flory Olson MD, 50 mg at 12/01/18 0816  •  sodium chloride (OCEAN) nasal spray 2 spray, 2 spray, Each Nare, PRN, Flory Olson MD  •  traZODone (DESYREL) tablet 50 mg, 50 mg, Oral, Nightly PRN, Flory Olson MD, 50 mg at 11/27/18 2120    ASSESSMENT & PLAN:    Active Problems:  Polysubstance abuse (CMS/HCC)  -  continue detox with Subutex taper, and provide supportive treatment with clonidine taper and when necessary medications as needed     Generalized anxiety disorder  - Zoloft 50 mg by mouth daily  - revaluated for abd cramps and d/c zoloft if 2/2 Zoloft     Unspecified depressive disorder  - Zoloft  - TSH and free T4 within normal limits    Vitamin D Insufficieny  - will initiate on supplement    Mild malnutrition  - Will follow dietary recommendations    Hepatitis C reactive  - Asymptomatic  - Will follow up with PCP on an outpatient basis    Disposition: - Patient will be discharged on Monday and will ne transported to residential facility by police office in accordance with [place].    Suicide precautions: Suicide precaution Level 3 (q15 min checks)     Behavioral Health Treatment Plan and Problem List: I have reviewed and approved the Behavioral Health Treatment Plan and Problem list.  The patient has had a chance to review and agrees with the treatment plan.     Clinician:  Femi Shelton MD  12/01/18  3:17 PM

## 2018-12-01 NOTE — PLAN OF CARE
Problem: Patient Care Overview  Goal: Plan of Care Review  Outcome: Ongoing (interventions implemented as appropriate)   12/01/18 0001   Coping/Psychosocial   Plan of Care Reviewed With patient   Coping/Psychosocial   Patient Agreement with Plan of Care agrees   Plan of Care Review   Progress improving       Problem: Overarching Goals (Adult)  Goal: Adheres to Safety Considerations for Self and Others  Outcome: Ongoing (interventions implemented as appropriate)    Goal: Optimized Coping Skills in Response to Life Stressors  Outcome: Ongoing (interventions implemented as appropriate)    Goal: Develops/Participates in Therapeutic [place] to Support Successful Transition  Outcome: Ongoing (interventions implemented as appropriate)

## 2018-12-01 NOTE — PLAN OF CARE
Problem: Patient Care Overview  Goal: Plan of Care Review  Outcome: Ongoing (interventions implemented as appropriate)   12/01/18 0006   Coping/Psychosocial   Plan of Care Reviewed With patient   Coping/Psychosocial   Patient Agreement with Plan of Care agrees   Plan of Care Review   Progress improving   OTHER   Outcome Summary Patient has been sleeping most of the day in day room while watching TV. Anxiety 5, depression 3, denies S/I, H/I & A/V/H ideations.       Problem: Overarching Goals (Adult)  Goal: Adheres to Safety Considerations for Self and Others  Outcome: Ongoing (interventions implemented as appropriate)    Goal: Optimized Coping Skills in Response to Life Stressors  Outcome: Ongoing (interventions implemented as appropriate)    Goal: Develops/Participates in Therapeutic [place] to Support Successful Transition  Outcome: Ongoing (interventions implemented as appropriate)

## 2018-12-02 PROCEDURE — 99231 SBSQ HOSP IP/OBS SF/LOW 25: CPT | Performed by: PSYCHIATRY & NEUROLOGY

## 2018-12-02 RX ADMIN — DOXEPIN HYDROCHLORIDE 25 MG: 25 CAPSULE ORAL at 21:12

## 2018-12-02 RX ADMIN — MIRTAZAPINE 7.5 MG: 15 TABLET, FILM COATED ORAL at 21:11

## 2018-12-02 RX ADMIN — SERTRALINE 50 MG: 50 TABLET, FILM COATED ORAL at 08:18

## 2018-12-02 RX ADMIN — BUPRENORPHINE HCL 2 MG: 2 TABLET SUBLINGUAL at 08:18

## 2018-12-02 NOTE — PROGRESS NOTES
"INPATIENT PSYCHIATRIC PROGRESS NOTE    Name:  Korey Sierra  :  1995  MRN:  [id number]  Visit Number:  [id number]  Length of stay:  5    SUBJECTIVE  CC: \"Polysubstance detox\"    INTERVAL HISTORY:  Seen lying in bed, asleep. Not participative. Denies any issues or concerns       Review of Systems   Constitutional: Negative for activity change, appetite change and fatigue.   HENT: Negative.    Respiratory: Negative.    Cardiovascular: Negative.    Gastrointestinal: Negative for nausea.        Abdominal cramps       OBJECTIVE    Temp:  [97.1 °F (36.2 °C)-97.9 °F (36.6 °C)] 97.1 °F (36.2 °C)  Heart Rate:  [68-84] 75  Resp:  [18-20] 18  BP: ()/(52-63) 95/52    MENTAL STATUS EXAM:  Appearance:Casually dressed, good hygeine. Thin male  Cooperation:Cooperative  Psychomotor: restless, No EPS, No motor tics  Speech-normal rate, amount.  Mood \"pretty good\"\"   Affect-congruen  Thought Content-goal directed, no delusional material present  Thought process-linear, organized.  Suicidality: No SI  Homicidality: No HI  Perception: No AH/VH  Insight-poor  Judgement-fair    Lab Results (last 24 hours)     ** No results found for the last 24 hours. **             Imaging Results (last 24 hours)     ** No results found for the last 24 hours. **             ECG/EMG Results (most recent)     Procedure Component Value Units Date/Time    ECG 12 Lead [[id number]] Collected:  18 1358     Updated:  18 1637    Narrative:       Test Reason : Potential adverse reaction to medications.  Blood Pressure : **/** mmHG  Vent. Rate : 062 BPM     Atrial Rate : 062 BPM     P-R Int : 126 ms          QRS Dur : 098 ms      QT Int : 406 ms       P-R-T Axes : 040 069 058 degrees     QTc Int : 412 ms    Normal sinus rhythm  RSR' or QR pattern in V1 suggests right ventricular conduction delay  Borderline ECG  When compared with ECG of 28-DEC-2017 16:08,  No significant change was found  Confirmed by Maxim Porter () on " 11/29/2018 4:36:54 PM    Referred By:  JAYCEE           Confirmed By:Maxim Subramaniyam           ALLERGIES: Patient has no known allergies.      Current Facility-Administered Medications:   •  aluminum-magnesium hydroxide-simethicone (MAALOX MAX) 400-400-40 MG/5ML suspension 15 mL, 15 mL, Oral, Q6H PRN, Flory Olson MD  •  benzonatate (TESSALON) capsule 100 mg, 100 mg, Oral, TID PRN, Flory Olson MD  •  benztropine (COGENTIN) tablet 1 mg, 1 mg, Oral, Daily PRN **OR** benztropine (COGENTIN) injection 0.5 mg, 0.5 mg, Intramuscular, Daily PRN, Flory Olson MD  •  cholecalciferol (VITAMIN D3) capsule 50,000 Units, 50,000 Units, Oral, Weekly, Flory Olson MD, 50,000 Units at 11/30/18 1837  •  doxepin (SINEquan) capsule 25 mg, 25 mg, Oral, Nightly, Flory Olson MD, 25 mg at 12/01/18 2009  •  famotidine (PEPCID) tablet 20 mg, 20 mg, Oral, BID PRN, Flory Olson MD  •  ibuprofen (ADVIL,MOTRIN) tablet 600 mg, 600 mg, Oral, Q6H PRN, Flory Olson MD, 600 mg at 11/29/18 0608  •  loperamide (IMODIUM) capsule 2 mg, 2 mg, Oral, 4x Daily PRN, Flory Olson MD  •  magnesium hydroxide (MILK OF MAGNESIA) suspension 2400 mg/10mL 10 mL, 10 mL, Oral, Daily PRN, Flory Olson MD  •  mirtazapine (REMERON) tablet 7.5 mg, 7.5 mg, Oral, Nightly, Flory Olson MD, 7.5 mg at 12/01/18 2010  •  sertraline (ZOLOFT) tablet 50 mg, 50 mg, Oral, Daily, Flory Olson MD, 50 mg at 12/02/18 0818  •  sodium chloride (OCEAN) nasal spray 2 spray, 2 spray, Each Nare, PRN, Jaycee, Ruhel, MD  •  traZODone (DESYREL) tablet 50 mg, 50 mg, Oral, Nightly PRN, Flory Olson MD, 50 mg at 11/27/18 2120    ASSESSMENT & PLAN:    Active Problems:  Polysubstance abuse (CMS/HCC)  -  continue detox with Subutex taper, and provide supportive treatment with clonidine taper and when necessary medications as needed     Generalized anxiety disorder  - Zoloft 50 mg by mouth daily  - revaluated for abd cramps and d/c zoloft if 2/2  Zoloft     Unspecified depressive disorder  - Zoloft  - TSH and free T4 within normal limits    Vitamin D Insufficieny  - will initiate on supplement    Mild malnutrition  - Will follow dietary recommendations    Hepatitis C reactive  - Asymptomatic  - Will follow up with PCP on an outpatient basis    Disposition: - Patient will be discharged on Monday and will ne transported to residential facility by police office in accordance with [place] law.    Suicide precautions: Suicide precaution Level 3 (q15 min checks)     Behavioral Health Treatment Plan and Problem List: I have reviewed and approved the Behavioral Health Treatment Plan and Problem list.  The patient has had a chance to review and agrees with the treatment plan.     Clinician:  Femi Shelton MD  12/02/18  6:58 PM

## 2018-12-02 NOTE — PLAN OF CARE
Problem: Patient Care Overview  Goal: Plan of Care Review  Outcome: Ongoing (interventions implemented as appropriate)   12/02/18 2517   Coping/Psychosocial   Plan of Care Reviewed With patient   Coping/Psychosocial   Patient Agreement with Plan of Care agrees   Plan of Care Review   Progress improving   OTHER   Outcome Summary Patient has been in bed almost all day except for meals and smoke breaks.       Problem: Overarching Goals (Adult)  Goal: Adheres to Safety Considerations for Self and Others  Outcome: Ongoing (interventions implemented as appropriate)    Goal: Optimized Coping Skills in Response to Life Stressors  Outcome: Ongoing (interventions implemented as appropriate)    Goal: Develops/Participates in Therapeutic [place] to Support Successful Transition  Outcome: Ongoing (interventions implemented as appropriate)

## 2018-12-02 NOTE — PLAN OF CARE
Problem: Patient Care Overview  Goal: Plan of Care Review  Outcome: Ongoing (interventions implemented as appropriate)   12/02/18 0018   Coping/Psychosocial   Plan of Care Reviewed With patient   Coping/Psychosocial   Patient Agreement with Plan of Care agrees   Plan of Care Review   Progress improving   OTHER   Outcome Summary Patient calm and cooperative. Rates anxiety 7. Denies wasserman. Wd's leg cramps, nausea, and vomiting. No problems noted. Will continue to monitor.        Problem: Overarching Goals (Adult)  Goal: Adheres to Safety Considerations for Self and Others  Outcome: Ongoing (interventions implemented as appropriate)    Goal: Optimized Coping Skills in Response to Life Stressors  Outcome: Ongoing (interventions implemented as appropriate)    Goal: Develops/Participates in Therapeutic [place] to Support Successful Transition  Outcome: Ongoing (interventions implemented as appropriate)

## 2018-12-03 VITALS
SYSTOLIC BLOOD PRESSURE: 132 MMHG | OXYGEN SATURATION: 98 % | DIASTOLIC BLOOD PRESSURE: 61 MMHG | WEIGHT: 119 LBS | TEMPERATURE: 97.2 F | HEIGHT: 68 IN | BODY MASS INDEX: 18.04 KG/M2 | RESPIRATION RATE: 18 BRPM | HEART RATE: 85 BPM

## 2018-12-03 PROBLEM — F11.23 OPIOID DEPENDENCE WITH WITHDRAWAL (HCC): Status: ACTIVE | Noted: 2017-12-28

## 2018-12-03 PROBLEM — F15.20 METHAMPHETAMINE DEPENDENCE (HCC): Status: ACTIVE | Noted: 2018-12-03

## 2018-12-03 PROBLEM — F41.1 GENERALIZED ANXIETY DISORDER: Status: ACTIVE | Noted: 2018-12-03

## 2018-12-03 PROCEDURE — 99238 HOSP IP/OBS DSCHRG MGMT 30/<: CPT | Performed by: PSYCHIATRY & NEUROLOGY

## 2018-12-03 RX ORDER — TRAZODONE HYDROCHLORIDE 50 MG/1
50 TABLET ORAL NIGHTLY PRN
Qty: 30 TABLET | Refills: 0 | OUTPATIENT
Start: 2018-12-03 | End: 2019-08-28

## 2018-12-03 RX ADMIN — SERTRALINE 50 MG: 50 TABLET, FILM COATED ORAL at 08:09

## 2018-12-03 NOTE — PROGRESS NOTES
The patient is being discharged today. He will be directly admitted to [facility] upon discharge. Transportation arranged by Adali with [employer] at [facility]. [facility] will provide transportation. Family visiting prior to departure.

## 2018-12-03 NOTE — PLAN OF CARE
Problem: Patient Care Overview  Goal: Plan of Care Review  Outcome: Ongoing (interventions implemented as appropriate)   12/03/18 0107   Coping/Psychosocial   Plan of Care Reviewed With patient   Coping/Psychosocial   Patient Agreement with Plan of Care agrees   Plan of Care Review   Progress improving   OTHER   Outcome Summary Pt reports that he is being discharged in the am      12/03/18 0107   Coping/Psychosocial   Plan of Care Reviewed With patient   Coping/Psychosocial   Patient Agreement with Plan of Care agrees   Plan of Care Review   Progress improving   OTHER   Outcome Summary Pt reports that he is being discharged in the am       Problem: Overarching Goals (Adult)  Goal: Adheres to Safety Considerations for Self and Others  Outcome: Ongoing (interventions implemented as appropriate)    Goal: Optimized Coping Skills in Response to Life Stressors  Outcome: Ongoing (interventions implemented as appropriate)    Goal: Develops/Participates in Therapeutic Parker to Support Successful Transition  Outcome: Ongoing (interventions implemented as appropriate)      Problem: Impaired Control (Excessive Substance Use) (Adult)  Goal: Participates in Recovery Program (Excessive Substance Use)  Outcome: Ongoing (interventions implemented as appropriate)

## 2018-12-03 NOTE — DISCHARGE SUMMARY
"      PSYCHIATRIC DISCHARGE SUMMARY     Patient Identification:  Name:  Korey Sierra  Age:  23 y.o.  Sex:  male  :  1995  MRN:  [id number]  Visit Number:  [id number]      Date of Admission:2018   Date of Discharge:  12/3/2018    Discharge Diagnosis:  Active Problems:    Opioid dependence with withdrawal (CMS/HCC)    Methamphetamine dependence (CMS/HCC)    Generalized anxiety disorder        Admission Diagnosis:  Polysubstance abuse (CMS/HCC) [F19.10]     Hospital Course  Patient is a 23 y.o. male presented with opioid and meth use along with withdrawals. The patient was admitted to the [facility] detox recovery unit for safety, further evaluation and treatment. The patient was court ordered for treatment after his father filed a petition under [employer].  He was treated with Subutex and Clonidine detox regimens and he was able to complete the detox without any complication.  He reported ongoing anxiety and was started on Zoloft for EZRA. For insomnia he was prescribed trazodone, mirtazapine, and doxepin and he eventually decided to take only trazodone.  The patient was also able to take part in individual and group counseling sessions and work on appropriate coping skills.  The patient made steady improvement in his mood and expressed feeling more positive and hopeful about future. Sleep and appetite were improved.  The day of discharge the patient was calm, cooperative and pleasant. Mood was reported to be good, and denied SI/HI/AVH. Also reported no medication side effects.        Mental Status Exam upon discharge:   Mood \"good\"   Affect-congruent, appropriate, stable  Thought Content-goal directed, no delusional material present  Thought process-linear, organized.  Suicidality: No SI  Homicidality: No HI  Perception: No AH/VH    Procedures Performed         Consults:   Consults     No orders found from 10/29/2018 to 2018.          Pertinent Test Results:   ALT 63, AST 38, TSH wnl, Vit " D wnl, Hep C reactive, UDS positive for amphetamine, and  buprenorphine.      Condition on Discharge:  improved    Vital Signs  Temp:  [97.1 °F (36.2 °C)-98.2 °F (36.8 °C)] 97.8 °F (36.6 °C)  Heart Rate:  [69-83] 83  Resp:  [18] 18  BP: ()/(52-77) 116/70      Discharge Disposition:  Home or Self Care    Discharge Medications:     Discharge Medications      New Medications      Instructions Start Date   sertraline 50 MG tablet  Commonly known as:  ZOLOFT   50 mg, Oral, Daily      traZODone 50 MG tablet  Commonly known as:  DESYREL   50 mg, Oral, Nightly PRN             Discharge Diet: Regular    Activity at Discharge: As tolerated    Follow-up Appointments        [facility]  [address] [place] [address] [place] [address] [phone]         Test Results Pending at Discharge      Clinician:   Neda Parker MD  12/03/18  9:59 AM

## 2019-01-14 ENCOUNTER — HOSPITAL ENCOUNTER (EMERGENCY)
Facility: HOSPITAL | Age: 24
Discharge: HOME | End: 2019-01-14
Attending: EMERGENCY MEDICINE | Admitting: EMERGENCY MEDICINE

## 2019-01-14 ENCOUNTER — APPOINTMENT (OUTPATIENT)
Dept: GENERAL RADIOLOGY | Facility: HOSPITAL | Age: 24
End: 2019-01-14

## 2019-01-14 VITALS
BODY MASS INDEX: 19.6 KG/M2 | TEMPERATURE: 99.9 F | HEART RATE: 78 BPM | DIASTOLIC BLOOD PRESSURE: 66 MMHG | SYSTOLIC BLOOD PRESSURE: 114 MMHG | WEIGHT: 140 LBS | OXYGEN SATURATION: 98 % | RESPIRATION RATE: 20 BRPM | HEIGHT: 71 IN

## 2019-01-14 DIAGNOSIS — F19.10 POLYSUBSTANCE ABUSE (HCC): ICD-10-CM

## 2019-01-14 DIAGNOSIS — R07.9 CHEST PAIN, UNSPECIFIED TYPE: Primary | ICD-10-CM

## 2019-01-14 LAB
6-ACETYL MORPHINE: NEGATIVE
A-A DO2: 13.4 MMHG (ref 0–300)
ALBUMIN SERPL-MCNC: 4.9 G/DL (ref 3.5–5)
ALBUMIN/GLOB SERPL: 1.4 G/DL (ref 1.5–2.5)
ALP SERPL-CCNC: 69 U/L (ref 40–129)
ALT SERPL W P-5'-P-CCNC: 61 U/L (ref 10–44)
AMPHET+METHAMPHET UR QL: POSITIVE
ANION GAP SERPL CALCULATED.3IONS-SCNC: 8.2 MMOL/L (ref 3.6–11.2)
APTT PPP: 25.2 SECONDS (ref 23.8–36.1)
ARTERIAL PATENCY WRIST A: POSITIVE
AST SERPL-CCNC: 41 U/L (ref 10–34)
ATMOSPHERIC PRESS: 728 MMHG
BARBITURATES UR QL SCN: NEGATIVE
BASE EXCESS BLDA CALC-SCNC: 3.1 MMOL/L
BASOPHILS # BLD AUTO: 0.02 10*3/MM3 (ref 0–0.3)
BASOPHILS NFR BLD AUTO: 0.2 % (ref 0–2)
BDY SITE: ABNORMAL
BENZODIAZ UR QL SCN: NEGATIVE
BILIRUB SERPL-MCNC: 1.8 MG/DL (ref 0.2–1.8)
BILIRUB UR QL STRIP: NEGATIVE
BODY TEMPERATURE: 98.6 C
BUN BLD-MCNC: 9 MG/DL (ref 7–21)
BUN/CREAT SERPL: 11.1 (ref 7–25)
BUPRENORPHINE SERPL-MCNC: POSITIVE NG/ML
CALCIUM SPEC-SCNC: 10.5 MG/DL (ref 7.7–10)
CANNABINOIDS SERPL QL: NEGATIVE
CHLORIDE SERPL-SCNC: 101 MMOL/L (ref 99–112)
CLARITY UR: CLEAR
CO2 SERPL-SCNC: 23.8 MMOL/L (ref 24.3–31.9)
COCAINE UR QL: NEGATIVE
COHGB MFR BLD: 3.1 % (ref 0–5)
COLOR UR: YELLOW
CREAT BLD-MCNC: 0.81 MG/DL (ref 0.43–1.29)
CRP SERPL-MCNC: 0.51 MG/DL (ref 0–0.99)
DEPRECATED RDW RBC AUTO: 39.8 FL (ref 37–54)
EOSINOPHIL # BLD AUTO: 0.21 10*3/MM3 (ref 0–0.7)
EOSINOPHIL NFR BLD AUTO: 1.8 % (ref 0–5)
ERYTHROCYTE [DISTWIDTH] IN BLOOD BY AUTOMATED COUNT: 13.2 % (ref 11.5–14.5)
ERYTHROCYTE [SEDIMENTATION RATE] IN BLOOD: 5 MM/HR (ref 0–15)
GFR SERPL CREATININE-BSD FRML MDRD: 119 ML/MIN/1.73
GLOBULIN UR ELPH-MCNC: 3.4 GM/DL
GLUCOSE BLD-MCNC: 94 MG/DL (ref 70–110)
GLUCOSE UR STRIP-MCNC: NEGATIVE MG/DL
HCO3 BLDA-SCNC: 22 MMOL/L (ref 22–26)
HCT VFR BLD AUTO: 43.8 % (ref 42–52)
HCT VFR BLD CALC: 41 % (ref 42–52)
HGB BLD-MCNC: 15.2 G/DL (ref 14–18)
HGB BLDA-MCNC: 13.9 G/DL (ref 12–16)
HGB UR QL STRIP.AUTO: NEGATIVE
HOROWITZ INDEX BLD+IHG-RTO: 21 %
IMM GRANULOCYTES # BLD AUTO: 0.03 10*3/MM3 (ref 0–0.03)
IMM GRANULOCYTES NFR BLD AUTO: 0.3 % (ref 0–0.5)
INR PPP: 1 (ref 0.9–1.1)
KETONES UR QL STRIP: NEGATIVE
LEUKOCYTE ESTERASE UR QL STRIP.AUTO: NEGATIVE
LYMPHOCYTES # BLD AUTO: 3.67 10*3/MM3 (ref 1–3)
LYMPHOCYTES NFR BLD AUTO: 32.3 % (ref 21–51)
MCH RBC QN AUTO: 28.9 PG (ref 27–33)
MCHC RBC AUTO-ENTMCNC: 34.7 G/DL (ref 33–37)
MCV RBC AUTO: 83.3 FL (ref 80–94)
METHADONE UR QL SCN: NEGATIVE
METHGB BLD QL: 0.2 % (ref 0–3)
MODALITY: ABNORMAL
MONOCYTES # BLD AUTO: 1.1 10*3/MM3 (ref 0.1–0.9)
MONOCYTES NFR BLD AUTO: 9.7 % (ref 0–10)
NEUTROPHILS # BLD AUTO: 6.34 10*3/MM3 (ref 1.4–6.5)
NEUTROPHILS NFR BLD AUTO: 55.7 % (ref 30–70)
NITRITE UR QL STRIP: NEGATIVE
OPIATES UR QL: NEGATIVE
OSMOLALITY SERPL CALC.SUM OF ELEC: 264.8 MOSM/KG (ref 273–305)
OXYCODONE UR QL SCN: NEGATIVE
OXYHGB MFR BLDV: 95.2 % (ref 85–100)
PCO2 BLDA: 20.9 MM HG (ref 35–45)
PCP UR QL SCN: NEGATIVE
PH BLDA: 7.64 PH UNITS (ref 7.35–7.45)
PH UR STRIP.AUTO: 8.5 [PH] (ref 5–8)
PLATELET # BLD AUTO: 199 10*3/MM3 (ref 130–400)
PMV BLD AUTO: 11.3 FL (ref 6–10)
PO2 BLDA: 104.6 MM HG (ref 80–100)
POTASSIUM BLD-SCNC: 3.5 MMOL/L (ref 3.5–5.3)
PROT SERPL-MCNC: 8.3 G/DL (ref 6–8)
PROT UR QL STRIP: NEGATIVE
PROTHROMBIN TIME: 13.4 SECONDS (ref 11–15.4)
RBC # BLD AUTO: 5.26 10*6/MM3 (ref 4.7–6.1)
SAO2 % BLDCOA: 98.4 % (ref 90–100)
SODIUM BLD-SCNC: 133 MMOL/L (ref 135–153)
SP GR UR STRIP: 1.01 (ref 1–1.03)
TROPONIN I SERPL-MCNC: <0.006 NG/ML
UROBILINOGEN UR QL STRIP: ABNORMAL
WBC NRBC COR # BLD: 11.37 10*3/MM3 (ref 4.5–12.5)

## 2019-01-14 PROCEDURE — 80307 DRUG TEST PRSMV CHEM ANLYZR: CPT | Performed by: EMERGENCY MEDICINE

## 2019-01-14 PROCEDURE — 96361 HYDRATE IV INFUSION ADD-ON: CPT

## 2019-01-14 PROCEDURE — 25010000002 KETOROLAC TROMETHAMINE PER 15 MG: Performed by: EMERGENCY MEDICINE

## 2019-01-14 PROCEDURE — 85652 RBC SED RATE AUTOMATED: CPT | Performed by: EMERGENCY MEDICINE

## 2019-01-14 PROCEDURE — 36600 WITHDRAWAL OF ARTERIAL BLOOD: CPT | Performed by: EMERGENCY MEDICINE

## 2019-01-14 PROCEDURE — 82375 ASSAY CARBOXYHB QUANT: CPT | Performed by: EMERGENCY MEDICINE

## 2019-01-14 PROCEDURE — 85025 COMPLETE CBC W/AUTO DIFF WBC: CPT | Performed by: EMERGENCY MEDICINE

## 2019-01-14 PROCEDURE — 71045 X-RAY EXAM CHEST 1 VIEW: CPT

## 2019-01-14 PROCEDURE — 25010000002 HALOPERIDOL LACTATE PER 5 MG: Performed by: EMERGENCY MEDICINE

## 2019-01-14 PROCEDURE — 71045 X-RAY EXAM CHEST 1 VIEW: CPT | Performed by: RADIOLOGY

## 2019-01-14 PROCEDURE — 80053 COMPREHEN METABOLIC PANEL: CPT | Performed by: EMERGENCY MEDICINE

## 2019-01-14 PROCEDURE — 93005 ELECTROCARDIOGRAM TRACING: CPT | Performed by: EMERGENCY MEDICINE

## 2019-01-14 PROCEDURE — 82805 BLOOD GASES W/O2 SATURATION: CPT | Performed by: EMERGENCY MEDICINE

## 2019-01-14 PROCEDURE — 86140 C-REACTIVE PROTEIN: CPT | Performed by: EMERGENCY MEDICINE

## 2019-01-14 PROCEDURE — 96374 THER/PROPH/DIAG INJ IV PUSH: CPT

## 2019-01-14 PROCEDURE — 81003 URINALYSIS AUTO W/O SCOPE: CPT | Performed by: EMERGENCY MEDICINE

## 2019-01-14 PROCEDURE — 93010 ELECTROCARDIOGRAM REPORT: CPT | Performed by: INTERNAL MEDICINE

## 2019-01-14 PROCEDURE — 85610 PROTHROMBIN TIME: CPT | Performed by: EMERGENCY MEDICINE

## 2019-01-14 PROCEDURE — 85730 THROMBOPLASTIN TIME PARTIAL: CPT | Performed by: EMERGENCY MEDICINE

## 2019-01-14 PROCEDURE — 84484 ASSAY OF TROPONIN QUANT: CPT | Performed by: EMERGENCY MEDICINE

## 2019-01-14 PROCEDURE — 99284 EMERGENCY DEPT VISIT MOD MDM: CPT

## 2019-01-14 PROCEDURE — 83050 HGB METHEMOGLOBIN QUAN: CPT | Performed by: EMERGENCY MEDICINE

## 2019-01-14 PROCEDURE — 96375 TX/PRO/DX INJ NEW DRUG ADDON: CPT

## 2019-01-14 RX ORDER — SODIUM CHLORIDE 0.9 % (FLUSH) 0.9 %
10 SYRINGE (ML) INJECTION AS NEEDED
Status: DISCONTINUED | OUTPATIENT
Start: 2019-01-14 | End: 2019-01-14 | Stop reason: HOSPADM

## 2019-01-14 RX ORDER — HALOPERIDOL 5 MG/ML
2 INJECTION INTRAMUSCULAR ONCE
Status: COMPLETED | OUTPATIENT
Start: 2019-01-14 | End: 2019-01-14

## 2019-01-14 RX ORDER — KETOROLAC TROMETHAMINE 30 MG/ML
30 INJECTION, SOLUTION INTRAMUSCULAR; INTRAVENOUS ONCE
Status: COMPLETED | OUTPATIENT
Start: 2019-01-14 | End: 2019-01-14

## 2019-01-14 RX ORDER — SODIUM CHLORIDE 9 MG/ML
125 INJECTION, SOLUTION INTRAVENOUS CONTINUOUS
Status: DISCONTINUED | OUTPATIENT
Start: 2019-01-14 | End: 2019-01-14 | Stop reason: HOSPADM

## 2019-01-14 RX ADMIN — SODIUM CHLORIDE 1000 ML: 9 INJECTION, SOLUTION INTRAVENOUS at 04:13

## 2019-01-14 RX ADMIN — KETOROLAC TROMETHAMINE 30 MG: 30 INJECTION, SOLUTION INTRAMUSCULAR; INTRAVENOUS at 05:05

## 2019-01-14 RX ADMIN — HALOPERIDOL LACTATE 2 MG: 5 INJECTION, SOLUTION INTRAMUSCULAR at 05:05

## 2019-01-14 RX ADMIN — SODIUM CHLORIDE 125 ML/HR: 9 INJECTION, SOLUTION INTRAVENOUS at 04:14

## 2019-01-14 NOTE — ED NOTES
Pt verbalizes doing suboxone &meth through shooting up. Pt started having chest pain after shooting up the drugs earlier before arrival.     Kitty Kulkarni, RN  01/14/19 6810

## 2019-01-14 NOTE — ED PROVIDER NOTES
Subjective   Patient comes in with complaint of left sided sharp stabbing chest pain and syncopal events.  Onset about 3 hours ago.  This was one hour after he last injected methamphetamine and Suboxone.        History provided by:  Patient and significant other  Chest Pain   Pain location:  L chest  Pain quality: sharp and stabbing    Pain radiates to:  Does not radiate  Pain severity:  Severe  Onset quality:  Sudden  Duration:  3 hours  Timing:  Constant  Progression:  Unchanged  Context: drug use    Context: not breathing, not eating, not intercourse, not lifting, not movement, not raising an arm, not at rest, not stress and not trauma    Relieved by:  Nothing  Worsened by:  Deep breathing  Ineffective treatments:  None tried  Associated symptoms: anxiety, near-syncope, palpitations, shortness of breath and syncope    Associated symptoms: no abdominal pain, no AICD problem, no altered mental status, no anorexia, no back pain, no claudication, no cough, no diaphoresis, no dizziness, no dysphagia, no fatigue, no fever, no headache, no heartburn, no lower extremity edema, no nausea, no numbness, no orthopnea, no PND, no vomiting and no weakness    Risk factors: male sex    Risk factors: no aortic disease, no birth control, no coronary artery disease, no diabetes mellitus, no Jamie-Danlos syndrome, no high cholesterol, no hypertension, no immobilization, no Marfan's syndrome, not obese, not pregnant, no prior DVT/PE, no smoking and no surgery        Review of Systems   Constitutional: Negative for activity change, appetite change, diaphoresis, fatigue and fever.   HENT: Negative.  Negative for trouble swallowing.    Eyes: Negative.    Respiratory: Positive for shortness of breath. Negative for cough.    Cardiovascular: Positive for chest pain, palpitations, syncope and near-syncope. Negative for orthopnea, claudication and PND.   Gastrointestinal: Negative.  Negative for abdominal pain, anorexia, heartburn, nausea  and vomiting.   Endocrine: Negative.    Genitourinary: Negative.    Musculoskeletal: Negative.  Negative for back pain.   Skin: Negative.    Allergic/Immunologic: Negative.    Neurological: Positive for syncope. Negative for dizziness, weakness, numbness and headaches.   Psychiatric/Behavioral: Positive for behavioral problems. The patient is nervous/anxious.    All other systems reviewed and are negative.      Past Medical History:   Diagnosis Date   • ADHD (attention deficit hyperactivity disorder)    • Anxiety    • Hepatitis C    • Substance abuse (CMS/[facility])    • Withdrawal symptoms, drug or narcotic (CMS/HCC)        No Known Allergies    Past Surgical History:   Procedure Laterality Date   • NO PAST SURGERIES         Family History   Problem Relation Age of Onset   • Drug abuse Paternal Uncle        Social History     Socioeconomic History   • Marital status: Single     Spouse name: Not on file   • Number of children: Not on file   • Years of education: Not on file   • Highest education level: Not on file   Tobacco Use   • Smoking status: Current Every Day Smoker     Packs/day: 2.00     Years: 8.00     Pack years: 16.00     Types: Cigarettes   • Smokeless tobacco: Never Used   Substance and Sexual Activity   • Alcohol use: No   • Drug use: Yes     Types: Methamphetamines, Opium     Comment: suboxone   • Sexual activity: Yes     Partners: Female     Birth control/protection: Condom           Objective   Physical Exam   Constitutional: He is oriented to person, place, and time. He appears well-developed and well-nourished. He appears distressed.   Anxious, hyperventilating   HENT:   Head: Normocephalic and atraumatic.   Nose: Nose normal.   Pasty mucous membranes   Eyes: Conjunctivae and EOM are normal. Pupils are equal, round, and reactive to light. Right eye exhibits no discharge. Left eye exhibits no discharge. No scleral icterus.   Neck: Normal range of motion. Neck supple. No tracheal deviation present.    Cardiovascular: Normal heart sounds and intact distal pulses. Exam reveals no gallop and no friction rub.   No murmur heard.  Regular tachycardia   Pulmonary/Chest: No stridor. No respiratory distress. He has no wheezes. He has no rales.   Hyperventilating   Abdominal: Soft. He exhibits no distension. There is no tenderness. There is no guarding.   Genitourinary:   Genitourinary Comments: No CVAT   Musculoskeletal: Normal range of motion. He exhibits no deformity.   Lymphadenopathy:     He has no cervical adenopathy.   Neurological: He is alert and oriented to person, place, and time. No cranial nerve deficit or sensory deficit. He exhibits normal muscle tone. Coordination normal.   Skin: Skin is warm and dry. Capillary refill takes less than 2 seconds. He is not diaphoretic. No pallor.   Psychiatric:   Anxious, hyperventilating   Nursing note and vitals reviewed.      Procedures           ED Course  ED Course as of Jan 14 0525   Mon Jan 14, 2019   0452 Currently on the monitor heart rate 101 O2 saturation 99% on room air blood pressure 134/84.  [TZ]      ED Course User Index  [TZ] Guy Coreas MD      XR Chest 1 View    (Results Pending)     Labs Reviewed   COMPREHENSIVE METABOLIC PANEL - Abnormal; Notable for the following components:       Result Value    Sodium 133 (*)     CO2 23.8 (*)     Calcium 10.5 (*)     Total Protein 8.3 (*)     ALT (SGPT) 61 (*)     AST (SGOT) 41 (*)     A/G Ratio 1.4 (*)     All other components within normal limits   URINALYSIS W/ MICROSCOPIC IF INDICATED (NO CULTURE) - Abnormal; Notable for the following components:    pH, UA 8.5 (*)     All other components within normal limits    Narrative:     Urine microscopic not indicated.   BLOOD GAS, ARTERIAL - Abnormal; Notable for the following components:    pH, Arterial 7.641 (*)     pCO2, Arterial 20.9 (*)     pO2, Arterial 104.6 (*)     Hematocrit, Blood Gas 41.0 (*)     All other components within normal limits   URINE DRUG  SCREEN - Abnormal; Notable for the following components:    Amphetamine Screen, Urine Positive (*)     Buprenorphine, Screen, Urine Positive (*)     All other components within normal limits    Narrative:     Negative Thresholds For Drugs Screened:                  Amphetamines              1000 ng/ml               Barbiturates               200 ng/ml               Benzodiazepines            200 ng/ml              Cocaine                    300 ng/ml              Methadone                  300 ng/ml              Opiates                    300 ng/ml               Phencyclidine               25 ng/ml               THC                         50 ng/ml              6-Acetyl Morphine           10 ng/ml              Buprenorphine                5 ng/ml              Oxycodone                  300 ng/ml    The reference range for all drugs tested is negative. This report includes final unconfirmed qualitative results to be used for medical treatment purposes only. Unconfirmed results must not be used for non-medical purposes such as employment or legal testing. Clinical consideration should be applied to any drug of abuse test, especially when unconfirmed quantitative results are used.     CBC WITH AUTO DIFFERENTIAL - Abnormal; Notable for the following components:    MPV 11.3 (*)     Lymphocytes, Absolute 3.67 (*)     Monocytes, Absolute 1.10 (*)     All other components within normal limits   OSMOLALITY, CALCULATED - Abnormal; Notable for the following components:    Osmolality Calc 264.8 (*)     All other components within normal limits   PROTIME-INR - Normal    Narrative:     Suggested INR therapeutic range for stable oral anticoagulant therapy:    Low Intensity therapy:   1.5-2.0  Moderate Intensity therapy:   2.0-3.0  High Intensity therapy:   2.5-4.0   APTT - Normal    Narrative:     PTT Heparin Therapeutic Range:  59 - 95 seconds   C-REACTIVE PROTEIN - Normal   SEDIMENTATION RATE - Normal   TROPONIN (IN-HOUSE) -  Normal    Narrative:     Ultra Troponin I Reference Range:         <=0.039 ng/mL: Negative    0.04-0.779 ng/mL: Indeterminate Range. Suspicious of MI.  Clinical correlation required.       >=0.78  ng/mL: Consistent with myocardial injury.  Clinical correlation required.   CBC AND DIFFERENTIAL    Narrative:     The following orders were created for panel order CBC & Differential.  Procedure                               Abnormality         Status                     ---------                               -----------         ------                     CBC Auto Differential[[id number]]        Abnormal            Final result                 Please view results for these tests on the individual orders.        Medication List      CONTINUE taking these medications    sertraline 50 MG tablet  Commonly known as:  ZOLOFT  Take 1 tablet by mouth Daily.     traZODone 50 MG tablet  Commonly known as:  DESYREL  Take 1 tablet by mouth At Night As Needed for Sleep.                    MDM  Number of Diagnoses or Management Options  Chest pain, unspecified type: new and requires workup  Polysubstance abuse (CMS/HCC): new and requires workup     Amount and/or Complexity of Data Reviewed  Clinical lab tests: ordered and reviewed  Tests in the radiology section of CPT®: ordered and reviewed    Risk of Complications, Morbidity, and/or Mortality  Presenting problems: high  Diagnostic procedures: moderate  Management options: high    Patient Progress  Patient progress: improved        Final diagnoses:   Chest pain, unspecified type   Polysubstance abuse (CMS/HCC)            Guy Coreas MD  01/14/19 0552

## 2019-08-28 ENCOUNTER — APPOINTMENT (OUTPATIENT)
Dept: CT IMAGING | Facility: HOSPITAL | Age: 24
End: 2019-08-28

## 2019-08-28 ENCOUNTER — APPOINTMENT (OUTPATIENT)
Dept: GENERAL RADIOLOGY | Facility: HOSPITAL | Age: 24
End: 2019-08-28

## 2019-08-28 ENCOUNTER — HOSPITAL ENCOUNTER (EMERGENCY)
Facility: HOSPITAL | Age: 24
Discharge: HOME | End: 2019-08-28
Attending: EMERGENCY MEDICINE | Admitting: EMERGENCY MEDICINE

## 2019-08-28 VITALS
RESPIRATION RATE: 18 BRPM | DIASTOLIC BLOOD PRESSURE: 87 MMHG | SYSTOLIC BLOOD PRESSURE: 116 MMHG | TEMPERATURE: 99.1 F | WEIGHT: 150 LBS | BODY MASS INDEX: 22.22 KG/M2 | OXYGEN SATURATION: 98 % | HEIGHT: 69 IN | HEART RATE: 84 BPM

## 2019-08-28 DIAGNOSIS — K12.1 MOUTH ULCERATION: ICD-10-CM

## 2019-08-28 DIAGNOSIS — F19.10 POLYSUBSTANCE ABUSE (HCC): Primary | ICD-10-CM

## 2019-08-28 LAB
6-ACETYL MORPHINE: NEGATIVE
ALBUMIN SERPL-MCNC: 4.71 G/DL (ref 3.5–5.2)
ALBUMIN/GLOB SERPL: 1.3 G/DL
ALP SERPL-CCNC: 51 U/L (ref 39–117)
ALT SERPL W P-5'-P-CCNC: 86 U/L (ref 1–41)
AMPHET+METHAMPHET UR QL: POSITIVE
ANION GAP SERPL CALCULATED.3IONS-SCNC: 19.1 MMOL/L (ref 5–15)
APAP SERPL-MCNC: <5 MCG/ML (ref 10–30)
AST SERPL-CCNC: 64 U/L (ref 1–40)
BARBITURATES UR QL SCN: NEGATIVE
BASOPHILS # BLD AUTO: 0.02 10*3/MM3 (ref 0–0.2)
BASOPHILS NFR BLD AUTO: 0.2 % (ref 0–1.5)
BENZODIAZ UR QL SCN: NEGATIVE
BILIRUB SERPL-MCNC: 2.1 MG/DL (ref 0.2–1.2)
BILIRUB UR QL STRIP: ABNORMAL
BUN BLD-MCNC: 9 MG/DL (ref 6–20)
BUN/CREAT SERPL: 12.3 (ref 7–25)
BUPRENORPHINE SERPL-MCNC: POSITIVE NG/ML
CALCIUM SPEC-SCNC: 9.6 MG/DL (ref 8.6–10.5)
CANNABINOIDS SERPL QL: NEGATIVE
CHLORIDE SERPL-SCNC: 94 MMOL/L (ref 98–107)
CLARITY UR: CLEAR
CO2 SERPL-SCNC: 22.9 MMOL/L (ref 22–29)
COCAINE UR QL: NEGATIVE
COLOR UR: ABNORMAL
CREAT BLD-MCNC: 0.73 MG/DL (ref 0.76–1.27)
CRP SERPL-MCNC: 2.28 MG/DL (ref 0–0.5)
DEPRECATED RDW RBC AUTO: 36.8 FL (ref 37–54)
EOSINOPHIL # BLD AUTO: 0.09 10*3/MM3 (ref 0–0.4)
EOSINOPHIL NFR BLD AUTO: 0.8 % (ref 0.3–6.2)
ERYTHROCYTE [DISTWIDTH] IN BLOOD BY AUTOMATED COUNT: 12.2 % (ref 12.3–15.4)
ETHANOL BLD-MCNC: <10 MG/DL (ref 0–10)
ETHANOL UR QL: <0.01 %
GFR SERPL CREATININE-BSD FRML MDRD: 134 ML/MIN/1.73
GLOBULIN UR ELPH-MCNC: 3.7 GM/DL
GLUCOSE BLD-MCNC: 82 MG/DL (ref 65–99)
GLUCOSE BLDC GLUCOMTR-MCNC: 78 MG/DL (ref 70–130)
GLUCOSE UR STRIP-MCNC: NEGATIVE MG/DL
HCT VFR BLD AUTO: 39.8 % (ref 37.5–51)
HGB BLD-MCNC: 13.9 G/DL (ref 13–17.7)
HGB UR QL STRIP.AUTO: NEGATIVE
IMM GRANULOCYTES # BLD AUTO: 0.01 10*3/MM3 (ref 0–0.05)
IMM GRANULOCYTES NFR BLD AUTO: 0.1 % (ref 0–0.5)
KETONES UR QL STRIP: ABNORMAL
LEUKOCYTE ESTERASE UR QL STRIP.AUTO: NEGATIVE
LYMPHOCYTES # BLD AUTO: 1.53 10*3/MM3 (ref 0.7–3.1)
LYMPHOCYTES NFR BLD AUTO: 14.1 % (ref 19.6–45.3)
MAGNESIUM SERPL-MCNC: 1.8 MG/DL (ref 1.6–2.6)
MCH RBC QN AUTO: 29.6 PG (ref 26.6–33)
MCHC RBC AUTO-ENTMCNC: 34.9 G/DL (ref 31.5–35.7)
MCV RBC AUTO: 84.7 FL (ref 79–97)
METHADONE UR QL SCN: NEGATIVE
MONOCYTES # BLD AUTO: 1.15 10*3/MM3 (ref 0.1–0.9)
MONOCYTES NFR BLD AUTO: 10.6 % (ref 5–12)
NEUTROPHILS # BLD AUTO: 8.08 10*3/MM3 (ref 1.7–7)
NEUTROPHILS NFR BLD AUTO: 74.2 % (ref 42.7–76)
NITRITE UR QL STRIP: NEGATIVE
OPIATES UR QL: NEGATIVE
OXYCODONE UR QL SCN: NEGATIVE
PCP UR QL SCN: NEGATIVE
PH UR STRIP.AUTO: 5.5 [PH] (ref 5–8)
PLATELET # BLD AUTO: 215 10*3/MM3 (ref 140–450)
PMV BLD AUTO: 11.5 FL (ref 6–12)
POTASSIUM BLD-SCNC: 4.3 MMOL/L (ref 3.5–5.2)
PROT SERPL-MCNC: 8.4 G/DL (ref 6–8.5)
PROT UR QL STRIP: NEGATIVE
RBC # BLD AUTO: 4.7 10*6/MM3 (ref 4.14–5.8)
SALICYLATES SERPL-MCNC: <0.3 MG/DL
SODIUM BLD-SCNC: 136 MMOL/L (ref 136–145)
SP GR UR STRIP: 1.03 (ref 1–1.03)
TSH SERPL DL<=0.05 MIU/L-ACNC: 0.52 UIU/ML (ref 0.27–4.2)
UROBILINOGEN UR QL STRIP: ABNORMAL
WBC NRBC COR # BLD: 10.88 10*3/MM3 (ref 3.4–10.8)

## 2019-08-28 PROCEDURE — 80307 DRUG TEST PRSMV CHEM ANLYZR: CPT | Performed by: PHYSICIAN ASSISTANT

## 2019-08-28 PROCEDURE — 83735 ASSAY OF MAGNESIUM: CPT | Performed by: PHYSICIAN ASSISTANT

## 2019-08-28 PROCEDURE — 84443 ASSAY THYROID STIM HORMONE: CPT | Performed by: PHYSICIAN ASSISTANT

## 2019-08-28 PROCEDURE — 70450 CT HEAD/BRAIN W/O DYE: CPT | Performed by: RADIOLOGY

## 2019-08-28 PROCEDURE — 81003 URINALYSIS AUTO W/O SCOPE: CPT | Performed by: PHYSICIAN ASSISTANT

## 2019-08-28 PROCEDURE — 71045 X-RAY EXAM CHEST 1 VIEW: CPT

## 2019-08-28 PROCEDURE — 80053 COMPREHEN METABOLIC PANEL: CPT | Performed by: PHYSICIAN ASSISTANT

## 2019-08-28 PROCEDURE — 71045 X-RAY EXAM CHEST 1 VIEW: CPT | Performed by: RADIOLOGY

## 2019-08-28 PROCEDURE — 96361 HYDRATE IV INFUSION ADD-ON: CPT

## 2019-08-28 PROCEDURE — 96360 HYDRATION IV INFUSION INIT: CPT

## 2019-08-28 PROCEDURE — 82962 GLUCOSE BLOOD TEST: CPT

## 2019-08-28 PROCEDURE — 86140 C-REACTIVE PROTEIN: CPT | Performed by: PHYSICIAN ASSISTANT

## 2019-08-28 PROCEDURE — 70450 CT HEAD/BRAIN W/O DYE: CPT

## 2019-08-28 PROCEDURE — 93005 ELECTROCARDIOGRAM TRACING: CPT | Performed by: PHYSICIAN ASSISTANT

## 2019-08-28 PROCEDURE — 85025 COMPLETE CBC W/AUTO DIFF WBC: CPT | Performed by: PHYSICIAN ASSISTANT

## 2019-08-28 PROCEDURE — 93010 ELECTROCARDIOGRAM REPORT: CPT | Performed by: INTERNAL MEDICINE

## 2019-08-28 PROCEDURE — 99284 EMERGENCY DEPT VISIT MOD MDM: CPT

## 2019-08-28 RX ORDER — SODIUM CHLORIDE 9 MG/ML
125 INJECTION, SOLUTION INTRAVENOUS CONTINUOUS
Status: DISCONTINUED | OUTPATIENT
Start: 2019-08-28 | End: 2019-08-28 | Stop reason: HOSPADM

## 2019-08-28 RX ORDER — DIPHENHYDRAMINE, LIDOCAINE, NYSTATIN
5 KIT ORAL ONCE
Status: COMPLETED | OUTPATIENT
Start: 2019-08-28 | End: 2019-08-28

## 2019-08-28 RX ORDER — SODIUM CHLORIDE 0.9 % (FLUSH) 0.9 %
10 SYRINGE (ML) INJECTION AS NEEDED
Status: DISCONTINUED | OUTPATIENT
Start: 2019-08-28 | End: 2019-08-28 | Stop reason: HOSPADM

## 2019-08-28 RX ADMIN — SODIUM CHLORIDE 125 ML/HR: 9 INJECTION, SOLUTION INTRAVENOUS at 17:07

## 2019-08-28 RX ADMIN — Medication 5 ML: at 18:46

## 2021-09-15 ENCOUNTER — IMMUNIZATION (OUTPATIENT)
Dept: VACCINE CLINIC | Facility: HOSPITAL | Age: 26
End: 2021-09-15

## 2021-09-15 PROCEDURE — 0001A: CPT | Performed by: INTERNAL MEDICINE

## 2021-09-15 PROCEDURE — 91300 HC SARSCOV02 VAC 30MCG/0.3ML IM: CPT | Performed by: INTERNAL MEDICINE

## 2021-12-04 ENCOUNTER — HOSPITAL ENCOUNTER (EMERGENCY)
Facility: HOSPITAL | Age: 26
Discharge: HOME | End: 2021-12-04
Attending: EMERGENCY MEDICINE | Admitting: EMERGENCY MEDICINE

## 2021-12-04 VITALS
SYSTOLIC BLOOD PRESSURE: 131 MMHG | TEMPERATURE: 97.9 F | DIASTOLIC BLOOD PRESSURE: 72 MMHG | HEIGHT: 67 IN | BODY MASS INDEX: 18.83 KG/M2 | HEART RATE: 89 BPM | RESPIRATION RATE: 16 BRPM | WEIGHT: 120 LBS | OXYGEN SATURATION: 100 %

## 2021-12-04 DIAGNOSIS — K04.7 DENTAL ABSCESS: Primary | ICD-10-CM

## 2021-12-04 PROCEDURE — 99283 EMERGENCY DEPT VISIT LOW MDM: CPT

## 2021-12-04 RX ORDER — CLINDAMYCIN HYDROCHLORIDE 150 MG/1
300 CAPSULE ORAL ONCE
Status: COMPLETED | OUTPATIENT
Start: 2021-12-04 | End: 2021-12-04

## 2021-12-04 RX ORDER — HYDROCODONE BITARTRATE AND ACETAMINOPHEN 7.5; 325 MG/1; MG/1
1 TABLET ORAL ONCE
Status: COMPLETED | OUTPATIENT
Start: 2021-12-04 | End: 2021-12-04

## 2021-12-04 RX ORDER — CLINDAMYCIN HYDROCHLORIDE 300 MG/1
300 CAPSULE ORAL 3 TIMES DAILY
Qty: 30 CAPSULE | Refills: 0 | Status: SHIPPED | OUTPATIENT
Start: 2021-12-04 | End: 2021-12-14

## 2021-12-04 RX ADMIN — BENZOCAINE: 200 LIQUID DENTAL; ORAL; PERIODONTAL at 00:46

## 2021-12-04 RX ADMIN — CLINDAMYCIN HYDROCHLORIDE 300 MG: 150 CAPSULE ORAL at 00:46

## 2021-12-04 RX ADMIN — HYDROCODONE BITARTRATE AND ACETAMINOPHEN 1 TABLET: 7.5; 325 TABLET ORAL at 00:46

## 2021-12-04 NOTE — ED PROVIDER NOTES
Subjective   Patient is a 26-year-old male with past medical history significant for ADHD, anxiety, hepatitis C, and substance abuse.  He presents to the ED today with complaints of right lower tooth abscess.  He reports that the pain and swelling to the right side of his face started about 45 days ago and has worsened.  He reports that he cannot get into his dentist until 12/14/2021.  Patient denies any other significant complaints or concerns at this time.      History provided by:  Patient  Dental Pain  Location:  Lower  Lower teeth location:  31/RL 2nd molar and 32/RL 3rd molar  Quality:  Constant and throbbing  Severity:  Moderate  Onset quality:  Sudden  Timing:  Constant  Progression:  Worsening  Context: abscess and dental caries    Relieved by:  Nothing  Associated symptoms: facial pain, facial swelling and gum swelling    Associated symptoms: no congestion, no difficulty swallowing, no drooling, no fever, no headaches, no neck pain, no neck swelling, no oral bleeding, no oral lesions and no trismus        Review of Systems   Constitutional: Negative.  Negative for fever.   HENT: Positive for dental problem and facial swelling. Negative for congestion, drooling, mouth sores, trouble swallowing and voice change.    Eyes: Negative.    Respiratory: Negative.    Cardiovascular: Negative.  Negative for chest pain.   Gastrointestinal: Negative.  Negative for abdominal pain.   Endocrine: Negative.    Genitourinary: Negative.  Negative for dysuria.   Musculoskeletal: Negative for neck pain.   Skin: Negative.    Allergic/Immunologic: Negative.    Neurological: Negative.  Negative for headaches.   Hematological: Negative.    Psychiatric/Behavioral: Negative.    All other systems reviewed and are negative.      Past Medical History:   Diagnosis Date   • ADHD (attention deficit hyperactivity disorder)    • Anxiety    • Hepatitis C    • Substance abuse (CMS/HCC)    • Withdrawal symptoms, drug or narcotic (CMS/HCC)         No Known Allergies    Past Surgical History:   Procedure Laterality Date   • NO PAST SURGERIES         Family History   Problem Relation Age of Onset   • Drug abuse Paternal Uncle        Social History     Socioeconomic History   • Marital status: Single   Tobacco Use   • Smoking status: Current Every Day Smoker     Packs/day: 2.00     Years: 8.00     Pack years: 16.00     Types: Cigarettes   • Smokeless tobacco: Never Used   Substance and Sexual Activity   • Alcohol use: No   • Drug use: Yes     Types: Methamphetamines, Opium     Comment: suboxone   • Sexual activity: Yes     Partners: Female     Birth control/protection: Condom           Objective   Physical Exam  Vitals and nursing note reviewed.   Constitutional:       General: He is not in acute distress.     Appearance: He is well-developed. He is not diaphoretic.   HENT:      Head: Normocephalic and atraumatic.      Right Ear: External ear normal.      Left Ear: External ear normal.      Nose: Nose normal.      Mouth/Throat:      Dentition: Dental tenderness, dental caries and dental abscesses present.      Pharynx: Oropharynx is clear.   Eyes:      Conjunctiva/sclera: Conjunctivae normal.      Pupils: Pupils are equal, round, and reactive to light.   Neck:      Vascular: No JVD.      Trachea: No tracheal deviation.   Cardiovascular:      Rate and Rhythm: Normal rate and regular rhythm.      Heart sounds: Normal heart sounds. No murmur heard.      Pulmonary:      Effort: Pulmonary effort is normal. No respiratory distress.      Breath sounds: Normal breath sounds. No wheezing.   Abdominal:      General: Bowel sounds are normal.      Palpations: Abdomen is soft.      Tenderness: There is no abdominal tenderness.   Musculoskeletal:         General: No deformity. Normal range of motion.      Cervical back: Normal range of motion and neck supple.   Skin:     General: Skin is warm and dry.      Capillary Refill: Capillary refill takes less than 2 seconds.       Coloration: Skin is not pale.      Findings: No erythema or rash.   Neurological:      General: No focal deficit present.      Mental Status: He is alert and oriented to person, place, and time. Mental status is at baseline.      Cranial Nerves: No cranial nerve deficit.   Psychiatric:         Mood and Affect: Mood normal.         Behavior: Behavior normal.         Thought Content: Thought content normal.         Judgment: Judgment normal.         Procedures           ED Course                                                 MDM    Final diagnoses:   Dental abscess       ED Disposition  ED Disposition     ED Disposition Condition Comment    Discharge Stable           Zak Patel, APRN  [address]  [place] [id number]  [phone]    Call in 2 days      PATIENT CONNECTION - [place]  See Provider List  [place] [address]  [phone]  Call in 2 days           Medication List      New Prescriptions    clindamycin 300 MG capsule  Commonly known as: CLEOCIN  Take 1 capsule by mouth 3 (Three) Times a Day for 10 days.           Where to Get Your Medications      These medications were sent to [id number] - [address] AT [facility] [address] [facility] - [phone] [facility] [phone] [address] [address]    Phone: [phone]   · clindamycin 300 MG capsule          Ayanna Mina, DEMARCO  12/04/21 4942

## 2022-01-15 ENCOUNTER — HOSPITAL ENCOUNTER (EMERGENCY)
Facility: HOSPITAL | Age: 27
Discharge: HOME | End: 2022-01-15
Attending: STUDENT IN AN ORGANIZED HEALTH CARE EDUCATION/TRAINING PROGRAM | Admitting: STUDENT IN AN ORGANIZED HEALTH CARE EDUCATION/TRAINING PROGRAM

## 2022-01-15 ENCOUNTER — APPOINTMENT (OUTPATIENT)
Dept: GENERAL RADIOLOGY | Facility: HOSPITAL | Age: 27
End: 2022-01-15

## 2022-01-15 VITALS
WEIGHT: 120 LBS | RESPIRATION RATE: 18 BRPM | BODY MASS INDEX: 18.83 KG/M2 | OXYGEN SATURATION: 98 % | HEART RATE: 101 BPM | TEMPERATURE: 99.8 F | DIASTOLIC BLOOD PRESSURE: 57 MMHG | SYSTOLIC BLOOD PRESSURE: 103 MMHG | HEIGHT: 67 IN

## 2022-01-15 DIAGNOSIS — U07.1 COVID-19: Primary | ICD-10-CM

## 2022-01-15 LAB
ALBUMIN SERPL-MCNC: 4.41 G/DL (ref 3.5–5.2)
ALBUMIN/GLOB SERPL: 1.8 G/DL
ALP SERPL-CCNC: 52 U/L (ref 39–117)
ALT SERPL W P-5'-P-CCNC: 20 U/L (ref 1–41)
AMYLASE SERPL-CCNC: 32 U/L (ref 28–100)
ANION GAP SERPL CALCULATED.3IONS-SCNC: 11.2 MMOL/L (ref 5–15)
AST SERPL-CCNC: 16 U/L (ref 1–40)
BASOPHILS # BLD AUTO: 0.03 10*3/MM3 (ref 0–0.2)
BASOPHILS NFR BLD AUTO: 0.5 % (ref 0–1.5)
BILIRUB SERPL-MCNC: 0.5 MG/DL (ref 0–1.2)
BUN SERPL-MCNC: 10 MG/DL (ref 6–20)
BUN/CREAT SERPL: 9.9 (ref 7–25)
CALCIUM SPEC-SCNC: 8.9 MG/DL (ref 8.6–10.5)
CHLORIDE SERPL-SCNC: 99 MMOL/L (ref 98–107)
CO2 SERPL-SCNC: 22.8 MMOL/L (ref 22–29)
CREAT SERPL-MCNC: 1.01 MG/DL (ref 0.76–1.27)
CRP SERPL-MCNC: 0.37 MG/DL (ref 0–0.5)
DEPRECATED RDW RBC AUTO: 39.1 FL (ref 37–54)
EOSINOPHIL # BLD AUTO: 0.01 10*3/MM3 (ref 0–0.4)
EOSINOPHIL NFR BLD AUTO: 0.2 % (ref 0.3–6.2)
ERYTHROCYTE [DISTWIDTH] IN BLOOD BY AUTOMATED COUNT: 12.6 % (ref 12.3–15.4)
ETHANOL BLD-MCNC: <10 MG/DL (ref 0–10)
ETHANOL UR QL: <0.01 %
FLUAV RNA RESP QL NAA+PROBE: NOT DETECTED
FLUBV RNA RESP QL NAA+PROBE: NOT DETECTED
GFR SERPL CREATININE-BSD FRML MDRD: 90 ML/MIN/1.73
GLOBULIN UR ELPH-MCNC: 2.5 GM/DL
GLUCOSE SERPL-MCNC: 107 MG/DL (ref 65–99)
HCT VFR BLD AUTO: 40.4 % (ref 37.5–51)
HGB BLD-MCNC: 13.6 G/DL (ref 13–17.7)
HOLD SPECIMEN: NORMAL
HOLD SPECIMEN: NORMAL
IMM GRANULOCYTES # BLD AUTO: 0.02 10*3/MM3 (ref 0–0.05)
IMM GRANULOCYTES NFR BLD AUTO: 0.3 % (ref 0–0.5)
LIPASE SERPL-CCNC: 27 U/L (ref 13–60)
LYMPHOCYTES # BLD AUTO: 0.69 10*3/MM3 (ref 0.7–3.1)
LYMPHOCYTES NFR BLD AUTO: 11.7 % (ref 19.6–45.3)
MCH RBC QN AUTO: 28.7 PG (ref 26.6–33)
MCHC RBC AUTO-ENTMCNC: 33.7 G/DL (ref 31.5–35.7)
MCV RBC AUTO: 85.2 FL (ref 79–97)
MONOCYTES # BLD AUTO: 0.85 10*3/MM3 (ref 0.1–0.9)
MONOCYTES NFR BLD AUTO: 14.4 % (ref 5–12)
NEUTROPHILS NFR BLD AUTO: 4.32 10*3/MM3 (ref 1.7–7)
NEUTROPHILS NFR BLD AUTO: 72.9 % (ref 42.7–76)
NRBC BLD AUTO-RTO: 0 /100 WBC (ref 0–0.2)
PLATELET # BLD AUTO: 212 10*3/MM3 (ref 140–450)
PMV BLD AUTO: 10.8 FL (ref 6–12)
POTASSIUM SERPL-SCNC: 3.6 MMOL/L (ref 3.5–5.2)
PROT SERPL-MCNC: 6.9 G/DL (ref 6–8.5)
RBC # BLD AUTO: 4.74 10*6/MM3 (ref 4.14–5.8)
SARS-COV-2 RNA RESP QL NAA+PROBE: DETECTED
SODIUM SERPL-SCNC: 133 MMOL/L (ref 136–145)
WBC NRBC COR # BLD: 5.92 10*3/MM3 (ref 3.4–10.8)
WHOLE BLOOD HOLD SPECIMEN: NORMAL
WHOLE BLOOD HOLD SPECIMEN: NORMAL

## 2022-01-15 PROCEDURE — 96374 THER/PROPH/DIAG INJ IV PUSH: CPT

## 2022-01-15 PROCEDURE — 83690 ASSAY OF LIPASE: CPT | Performed by: PHYSICIAN ASSISTANT

## 2022-01-15 PROCEDURE — 87636 SARSCOV2 & INF A&B AMP PRB: CPT | Performed by: PHYSICIAN ASSISTANT

## 2022-01-15 PROCEDURE — 82150 ASSAY OF AMYLASE: CPT | Performed by: PHYSICIAN ASSISTANT

## 2022-01-15 PROCEDURE — 80053 COMPREHEN METABOLIC PANEL: CPT | Performed by: PHYSICIAN ASSISTANT

## 2022-01-15 PROCEDURE — 85025 COMPLETE CBC W/AUTO DIFF WBC: CPT | Performed by: PHYSICIAN ASSISTANT

## 2022-01-15 PROCEDURE — 82077 ASSAY SPEC XCP UR&BREATH IA: CPT | Performed by: PHYSICIAN ASSISTANT

## 2022-01-15 PROCEDURE — 71045 X-RAY EXAM CHEST 1 VIEW: CPT

## 2022-01-15 PROCEDURE — 99283 EMERGENCY DEPT VISIT LOW MDM: CPT

## 2022-01-15 PROCEDURE — 86140 C-REACTIVE PROTEIN: CPT | Performed by: PHYSICIAN ASSISTANT

## 2022-01-15 PROCEDURE — 36415 COLL VENOUS BLD VENIPUNCTURE: CPT

## 2022-01-15 PROCEDURE — 25010000002 KETOROLAC TROMETHAMINE PER 15 MG: Performed by: NURSE PRACTITIONER

## 2022-01-15 RX ORDER — SODIUM CHLORIDE 0.9 % (FLUSH) 0.9 %
10 SYRINGE (ML) INJECTION AS NEEDED
Status: DISCONTINUED | OUTPATIENT
Start: 2022-01-15 | End: 2022-01-15 | Stop reason: HOSPADM

## 2022-01-15 RX ORDER — KETOROLAC TROMETHAMINE 30 MG/ML
30 INJECTION, SOLUTION INTRAMUSCULAR; INTRAVENOUS ONCE
Status: COMPLETED | OUTPATIENT
Start: 2022-01-15 | End: 2022-01-15

## 2022-01-15 RX ORDER — AZITHROMYCIN 1 G
1 PACKET (EA) ORAL ONCE
Qty: 1 PACKET | Refills: 0 | Status: SHIPPED | OUTPATIENT
Start: 2022-01-15 | End: 2022-01-15

## 2022-01-15 RX ORDER — DEXAMETHASONE 6 MG/1
6 TABLET ORAL
Qty: 7 TABLET | Refills: 0 | Status: SHIPPED | OUTPATIENT
Start: 2022-01-15 | End: 2022-01-22

## 2022-01-15 RX ORDER — ACETAMINOPHEN 500 MG
1000 TABLET ORAL ONCE
Status: COMPLETED | OUTPATIENT
Start: 2022-01-15 | End: 2022-01-15

## 2022-01-15 RX ADMIN — KETOROLAC TROMETHAMINE 30 MG: 30 INJECTION, SOLUTION INTRAMUSCULAR at 02:28

## 2022-01-15 RX ADMIN — ACETAMINOPHEN 1000 MG: 500 TABLET ORAL at 02:38

## 2022-01-15 RX ADMIN — SODIUM CHLORIDE 1000 ML: 9 INJECTION, SOLUTION INTRAVENOUS at 02:28

## 2022-01-15 NOTE — ED NOTES
MEDICAL SCREENING:    Reason for Visit: headache, bilateral flank pain, decreased urination    Patient initially seen in triage.  The patient was advised further evaluation and diagnostic testing will be needed, some of the treatment and testing will be initiated in the lobby in order to begin the process.  The patient will be returned to the waiting area for the time being and possibly be re-assessed by a subsequent ED provider.  The patient will be brought back to the treatment area in as timely manner as possible.       Lorena Whitehead PA  01/15/22 0101

## 2022-01-15 NOTE — ED PROVIDER NOTES
Subjective     History provided by:  Patient  URI  Presenting symptoms: congestion, cough, fatigue and fever    Severity:  Moderate  Onset quality:  Sudden  Duration:  2 days  Timing:  Constant  Progression:  Worsening  Chronicity:  New  Associated symptoms: arthralgias, headaches and myalgias    Associated symptoms: no wheezing    Risk factors: sick contacts        Review of Systems   Constitutional: Positive for fatigue and fever.   HENT: Positive for congestion.    Eyes: Negative.    Respiratory: Positive for cough. Negative for wheezing.    Cardiovascular: Negative.  Negative for chest pain.   Gastrointestinal: Negative.  Negative for abdominal pain.   Endocrine: Negative.    Genitourinary: Negative.  Negative for dysuria.   Musculoskeletal: Positive for arthralgias and myalgias.   Skin: Negative.    Allergic/Immunologic: Negative.    Neurological: Positive for headaches.   Hematological: Negative.    Psychiatric/Behavioral: Negative.    All other systems reviewed and are negative.      Past Medical History:   Diagnosis Date   • ADHD (attention deficit hyperactivity disorder)    • Anxiety    • Hepatitis C    • Substance abuse (CMS/HCC)    • Withdrawal symptoms, drug or narcotic (CMS/HCC)        No Known Allergies    Past Surgical History:   Procedure Laterality Date   • NO PAST SURGERIES         Family History   Problem Relation Age of Onset   • Drug abuse Paternal Uncle        Social History     Socioeconomic History   • Marital status: Single   Tobacco Use   • Smoking status: Current Every Day Smoker     Packs/day: 2.00     Years: 8.00     Pack years: 16.00     Types: Cigarettes   • Smokeless tobacco: Never Used   Substance and Sexual Activity   • Alcohol use: No   • Drug use: Yes     Types: Methamphetamines, Opium     Comment: suboxone   • Sexual activity: Yes     Partners: Female     Birth control/protection: Condom           Objective   Physical Exam  Vitals and nursing note reviewed.   Constitutional:        General: He is not in acute distress.     Appearance: He is well-developed. He is not diaphoretic.   HENT:      Head: Normocephalic and atraumatic.      Right Ear: External ear normal.      Left Ear: External ear normal.      Nose: Nose normal.      Mouth/Throat:      Mouth: Mucous membranes are moist.      Pharynx: Oropharynx is clear.   Eyes:      Conjunctiva/sclera: Conjunctivae normal.      Pupils: Pupils are equal, round, and reactive to light.   Neck:      Vascular: No JVD.      Trachea: No tracheal deviation.   Cardiovascular:      Rate and Rhythm: Normal rate and regular rhythm.      Heart sounds: Normal heart sounds. No murmur heard.      Pulmonary:      Effort: Pulmonary effort is normal. No respiratory distress.      Breath sounds: Normal breath sounds. No wheezing.   Abdominal:      General: Bowel sounds are normal.      Palpations: Abdomen is soft.      Tenderness: There is no abdominal tenderness.   Musculoskeletal:         General: No deformity. Normal range of motion.      Cervical back: Normal range of motion and neck supple.   Skin:     General: Skin is warm and dry.      Capillary Refill: Capillary refill takes less than 2 seconds.      Coloration: Skin is not pale.      Findings: No erythema or rash.   Neurological:      General: No focal deficit present.      Mental Status: He is alert and oriented to person, place, and time. Mental status is at baseline.      Cranial Nerves: No cranial nerve deficit.   Psychiatric:         Mood and Affect: Mood normal.         Behavior: Behavior normal.         Thought Content: Thought content normal.         Judgment: Judgment normal.         Procedures       Results for orders placed or performed during the hospital encounter of 01/15/22   COVID-19 and FLU A/B PCR - Swab, Nasopharynx    Specimen: Nasopharynx; Swab   Result Value Ref Range    COVID19 Detected (C) Not Detected - Ref. Range    Influenza A PCR Not Detected Not Detected    Influenza B PCR Not  Detected Not Detected   Comprehensive Metabolic Panel    Specimen: Arm, Left; Blood   Result Value Ref Range    Glucose 107 (H) 65 - 99 mg/dL    BUN 10 6 - 20 mg/dL    Creatinine 1.01 0.76 - 1.27 mg/dL    Sodium 133 (L) 136 - 145 mmol/L    Potassium 3.6 3.5 - 5.2 mmol/L    Chloride 99 98 - 107 mmol/L    CO2 22.8 22.0 - 29.0 mmol/L    Calcium 8.9 8.6 - 10.5 mg/dL    Total Protein 6.9 6.0 - 8.5 g/dL    Albumin 4.41 3.50 - 5.20 g/dL    ALT (SGPT) 20 1 - 41 U/L    AST (SGOT) 16 1 - 40 U/L    Alkaline Phosphatase 52 39 - 117 U/L    Total Bilirubin 0.5 0.0 - 1.2 mg/dL    eGFR Non African Amer 90 >60 mL/min/1.73    Globulin 2.5 gm/dL    A/G Ratio 1.8 g/dL    BUN/Creatinine Ratio 9.9 7.0 - 25.0    Anion Gap 11.2 5.0 - 15.0 mmol/L   C-reactive Protein    Specimen: Arm, Left; Blood   Result Value Ref Range    C-Reactive Protein 0.37 0.00 - 0.50 mg/dL   Ethanol    Specimen: Arm, Left; Blood   Result Value Ref Range    Ethanol <10 0 - 10 mg/dL    Ethanol % <0.010 %   Lipase    Specimen: Arm, Left; Blood   Result Value Ref Range    Lipase 27 13 - 60 U/L   Amylase    Specimen: Arm, Left; Blood   Result Value Ref Range    Amylase 32 28 - 100 U/L   CBC Auto Differential    Specimen: Arm, Left; Blood   Result Value Ref Range    WBC 5.92 3.40 - 10.80 10*3/mm3    RBC 4.74 4.14 - 5.80 10*6/mm3    Hemoglobin 13.6 13.0 - 17.7 g/dL    Hematocrit 40.4 37.5 - 51.0 %    MCV 85.2 79.0 - 97.0 fL    MCH 28.7 26.6 - 33.0 pg    MCHC 33.7 31.5 - 35.7 g/dL    RDW 12.6 12.3 - 15.4 %    RDW-SD 39.1 37.0 - 54.0 fl    MPV 10.8 6.0 - 12.0 fL    Platelets 212 140 - 450 10*3/mm3    Neutrophil % 72.9 42.7 - 76.0 %    Lymphocyte % 11.7 (L) 19.6 - 45.3 %    Monocyte % 14.4 (H) 5.0 - 12.0 %    Eosinophil % 0.2 (L) 0.3 - 6.2 %    Basophil % 0.5 0.0 - 1.5 %    Immature Grans % 0.3 0.0 - 0.5 %    Neutrophils, Absolute 4.32 1.70 - 7.00 10*3/mm3    Lymphocytes, Absolute 0.69 (L) 0.70 - 3.10 10*3/mm3    Monocytes, Absolute 0.85 0.10 - 0.90 10*3/mm3    Eosinophils,  Absolute 0.01 0.00 - 0.40 10*3/mm3    Basophils, Absolute 0.03 0.00 - 0.20 10*3/mm3    Immature Grans, Absolute 0.02 0.00 - 0.05 10*3/mm3    nRBC 0.0 0.0 - 0.2 /100 WBC   Green Top (Gel)   Result Value Ref Range    Extra Tube Hold for add-ons.    Lavender Top   Result Value Ref Range    Extra Tube hold for add-on    Gold Top - SST   Result Value Ref Range    Extra Tube Hold for add-ons.    Light Blue Top   Result Value Ref Range    Extra Tube hold for add-on        ED Course  ED Course as of 01/15/22 0258   Sat Theodore 15, 2022   0158 XR Chest 1 View     IMPRESSION:  Slightly increased opacity in the right middle lobe, concerning for pneumonia. [MB]      ED Course User Index  [MB] Ayanna Mina APRN                                                 MDM    Final diagnoses:   COVID-19       ED Disposition  ED Disposition     ED Disposition Condition Comment    Discharge Stable           Zak Patel APRN  [address] [facility] [address]  [facility] [id number] [phone]    Call in 2 days           Medication List      New Prescriptions    azithromycin 1 g powder  Commonly known as: ZITHROMAX  Take 1 packet by mouth 1 (One) Time for 1 dose.     dexamethasone 6 MG tablet  Commonly known as: DECADRON  Take 1 tablet by mouth Daily With Breakfast for 7 days.           Where to Get Your Medications      These medications were sent to [facility][id number] [address] - [phone] PH - [phone]   [address] [address]    Phone: [phone]   · azithromycin 1 g powder  · dexamethasone 6 MG tablet          Ayanna Mina, DEMARCO  01/15/22 0258

## 2025-02-04 ENCOUNTER — HOSPITAL ENCOUNTER (OUTPATIENT)
Dept: CT IMAGING | Facility: HOSPITAL | Age: 30
Discharge: HOME | End: 2025-02-04
Payer: COMMERCIAL

## 2025-02-04 ENCOUNTER — OFFICE VISIT (OUTPATIENT)
Dept: SURGERY | Facility: CLINIC | Age: 30
End: 2025-02-04
Payer: COMMERCIAL

## 2025-02-04 VITALS
HEIGHT: 67 IN | WEIGHT: 126 LBS | BODY MASS INDEX: 19.78 KG/M2 | DIASTOLIC BLOOD PRESSURE: 58 MMHG | SYSTOLIC BLOOD PRESSURE: 100 MMHG

## 2025-02-04 DIAGNOSIS — R10.31 RLQ ABDOMINAL PAIN: Primary | ICD-10-CM

## 2025-02-04 DIAGNOSIS — R10.31 RLQ ABDOMINAL PAIN: ICD-10-CM

## 2025-02-04 PROCEDURE — 1159F MED LIST DOCD IN RCRD: CPT

## 2025-02-04 PROCEDURE — 99203 OFFICE O/P NEW LOW 30 MIN: CPT

## 2025-02-04 PROCEDURE — 74176 CT ABD & PELVIS W/O CONTRAST: CPT | Performed by: RADIOLOGY

## 2025-02-04 PROCEDURE — 74176 CT ABD & PELVIS W/O CONTRAST: CPT

## 2025-02-04 PROCEDURE — 1160F RVW MEDS BY RX/DR IN RCRD: CPT

## 2025-02-04 RX ORDER — POLYETHYLENE GLYCOL 3350 17 G/17G
17 POWDER, FOR SOLUTION ORAL DAILY
Qty: 510 G | Refills: 1 | Status: SHIPPED | OUTPATIENT
Start: 2025-02-04

## 2025-02-04 RX ORDER — DOCUSATE SODIUM 100 MG/1
100 CAPSULE, LIQUID FILLED ORAL 2 TIMES DAILY
Qty: 60 CAPSULE | Refills: 1 | Status: SHIPPED | OUTPATIENT
Start: 2025-02-04

## 2025-02-04 RX ORDER — AMOXICILLIN 875 MG/1
TABLET, COATED ORAL
COMMUNITY
Start: 2025-01-23

## 2025-02-04 NOTE — PROGRESS NOTES
Received and reviewed CT report and images personally. No acute findings per radiologist interpretation. Patient did have significant amount of stool, I have sent in miralax as well as docusate to pharmacy for him to start daily. I have contacted both numbers in his chart to update him, no answer.

## 2025-02-04 NOTE — PROGRESS NOTES
Subjective   Korey Sierra is a 29 y.o. male who presents today for Initial Evaluation    Chief Complaint:    Chief Complaint   Patient presents with    Abdominal Pain        History of Present Illness:    History of Present Illness Korey is a 29-year-old male who presents for an evaluation for right lower quadrant abdominal pain.  He reports this has been intermittent and going on times the past several weeks.  He reports he was evaluated at [facility] and wanted a second opinion due to a past experience.  He denies any past surgeries.  Reports nausea, vomiting and diarrhea.  Denies any fever or chills.  He currently is not having pain at this time.  He did have an ultrasound at [facility] that did reveal gallstones.  He denies any right upper quadrant epigastric abdominal pain.  Denies any known injuries.    The following portions of the patient's history were reviewed and updated as appropriate: allergies, current medications, past family history, past medical history, past social history, past surgical history and problem list.    Past Medical History:  Past Medical History:   Diagnosis Date    ADHD (attention deficit hyperactivity disorder)     Anxiety     Hepatitis C     Substance abuse     Withdrawal symptoms, drug or narcotic        Social History:  Social History     Socioeconomic History    Marital status: Single   Tobacco Use    Smoking status: Former     Current packs/day: 2.00     Average packs/day: 2.0 packs/day for 8.0 years (16.0 ttl pk-yrs)     Types: Cigarettes     Passive exposure: Past    Smokeless tobacco: Never   Vaping Use    Vaping status: Every Day    Substances: Nicotine    Devices: Refillable tank   Substance and Sexual Activity    Alcohol use: No    Drug use: Yes     Types: Methamphetamines, Opium     Comment: suboxone    Sexual activity: Yes     Partners: Female     Birth control/protection: Condom       Family History:  Family History   Problem Relation Age of Onset    Drug  "abuse Paternal Uncle        Past Surgical History:  Past Surgical History:   Procedure Laterality Date    NO PAST SURGERIES         Problem List:  Patient Active Problem List   Diagnosis    Opioid dependence with withdrawal    Methamphetamine dependence    Generalized anxiety disorder       Allergy:   No Known Allergies     Current Medications:   Current Outpatient Medications   Medication Sig Dispense Refill    amoxicillin (AMOXIL) 875 MG tablet take 1 tablet by mouth twice daily until gone      docusate sodium (Colace) 100 MG capsule Take 1 capsule by mouth 2 (Two) Times a Day. 60 capsule 1    polyethylene glycol (MiraLax) 17 GM/SCOOP powder Take 17 g by mouth Daily. 510 g 1     No current facility-administered medications for this visit.       Review of Systems:    14 pt ROS negative except for what is noted in HPI      Physical Exam:   Physical Exam  Constitutional:       Appearance: Normal appearance.   HENT:      Head: Normocephalic and atraumatic.      Right Ear: External ear normal.      Left Ear: External ear normal.   Eyes:      Conjunctiva/sclera: Conjunctivae normal.   Pulmonary:      Effort: Pulmonary effort is normal.   Abdominal:      General: Abdomen is flat. There is no distension.      Palpations: Abdomen is soft.      Tenderness: There is no abdominal tenderness.   Musculoskeletal:         General: Normal range of motion.      Cervical back: Normal range of motion and neck supple.   Skin:     General: Skin is warm and dry.      Capillary Refill: Capillary refill takes less than 2 seconds.   Neurological:      General: No focal deficit present.      Mental Status: He is alert and oriented to person, place, and time.   Psychiatric:         Mood and Affect: Mood normal.         Behavior: Behavior normal.         Vitals:  Blood pressure 100/58, height 170.2 cm (67.01\"), weight 57.2 kg (126 lb).   Body mass index is 19.73 kg/m².     Imaging:   I did review outside imaging.  Notable for gallstones.  No " wall thickening or pericholecystic fluid.  No common bile duct dilatation.     Assessment & Plan   Diagnoses and all orders for this visit:    1. RLQ abdominal pain (Primary)  -     Cancel: CT Abdomen Pelvis With Contrast; Future    Korey is a 29-year-old male who presents for evaluation for right lower quadrant pain.  I have ordered a CT of the abdomen and pelvis with contrast to further evaluate.  Patient preferred to receive a call to discuss results. We did discuss possibly doing a colonoscopy, patient declined.     Visit Diagnoses:    ICD-10-CM ICD-9-CM   1. RLQ abdominal pain  R10.31 789.03         MEDS ORDERED DURING VISIT:  No orders of the defined types were placed in this encounter.      Return if symptoms worsen or fail to improve.             This document has been electronically signed by DEMARCO Sullivan  February 7, 2025 09:36 EST    Please note that portions of this note were completed with a voice recognition program.

## 2025-02-07 ENCOUNTER — TELEPHONE (OUTPATIENT)
Dept: SURGERY | Facility: CLINIC | Age: 30
End: 2025-02-07
Payer: COMMERCIAL

## 2025-02-08 ENCOUNTER — HOSPITAL ENCOUNTER (EMERGENCY)
Facility: HOSPITAL | Age: 30
Discharge: HOME | End: 2025-02-08
Attending: FAMILY MEDICINE
Payer: COMMERCIAL

## 2025-02-08 VITALS
WEIGHT: 125 LBS | SYSTOLIC BLOOD PRESSURE: 124 MMHG | HEIGHT: 67 IN | TEMPERATURE: 97.8 F | BODY MASS INDEX: 19.62 KG/M2 | DIASTOLIC BLOOD PRESSURE: 70 MMHG | HEART RATE: 83 BPM | RESPIRATION RATE: 18 BRPM | OXYGEN SATURATION: 99 %

## 2025-02-08 DIAGNOSIS — J02.9 PHARYNGITIS, UNSPECIFIED ETIOLOGY: Primary | ICD-10-CM

## 2025-02-08 LAB
ALBUMIN SERPL-MCNC: 4.6 G/DL (ref 3.5–5.2)
ALBUMIN/GLOB SERPL: 1.4 G/DL
ALP SERPL-CCNC: 61 U/L (ref 39–117)
ALT SERPL W P-5'-P-CCNC: 23 U/L (ref 1–41)
ANION GAP SERPL CALCULATED.3IONS-SCNC: 11.3 MMOL/L (ref 5–15)
AST SERPL-CCNC: 20 U/L (ref 1–40)
BASOPHILS # BLD AUTO: 0.07 10*3/MM3 (ref 0–0.2)
BASOPHILS NFR BLD AUTO: 0.4 % (ref 0–1.5)
BILIRUB SERPL-MCNC: 1.2 MG/DL (ref 0–1.2)
BUN SERPL-MCNC: 9 MG/DL (ref 6–20)
BUN/CREAT SERPL: 9.7 (ref 7–25)
CALCIUM SPEC-SCNC: 9.6 MG/DL (ref 8.6–10.5)
CHLORIDE SERPL-SCNC: 103 MMOL/L (ref 98–107)
CO2 SERPL-SCNC: 25.7 MMOL/L (ref 22–29)
CREAT SERPL-MCNC: 0.93 MG/DL (ref 0.76–1.27)
DEPRECATED RDW RBC AUTO: 37.6 FL (ref 37–54)
EGFRCR SERPLBLD CKD-EPI 2021: 114.7 ML/MIN/1.73
EOSINOPHIL # BLD AUTO: 0.17 10*3/MM3 (ref 0–0.4)
EOSINOPHIL NFR BLD AUTO: 1 % (ref 0.3–6.2)
ERYTHROCYTE [DISTWIDTH] IN BLOOD BY AUTOMATED COUNT: 12.5 % (ref 12.3–15.4)
FLUAV RNA RESP QL NAA+PROBE: NOT DETECTED
FLUBV RNA RESP QL NAA+PROBE: NOT DETECTED
GLOBULIN UR ELPH-MCNC: 3.4 GM/DL
GLUCOSE SERPL-MCNC: 100 MG/DL (ref 65–99)
HCT VFR BLD AUTO: 42 % (ref 37.5–51)
HGB BLD-MCNC: 14.2 G/DL (ref 13–17.7)
HOLD SPECIMEN: NORMAL
HOLD SPECIMEN: NORMAL
IMM GRANULOCYTES # BLD AUTO: 0.08 10*3/MM3 (ref 0–0.05)
IMM GRANULOCYTES NFR BLD AUTO: 0.5 % (ref 0–0.5)
LYMPHOCYTES # BLD AUTO: 4.24 10*3/MM3 (ref 0.7–3.1)
LYMPHOCYTES NFR BLD AUTO: 25.3 % (ref 19.6–45.3)
MCH RBC QN AUTO: 28.5 PG (ref 26.6–33)
MCHC RBC AUTO-ENTMCNC: 33.8 G/DL (ref 31.5–35.7)
MCV RBC AUTO: 84.3 FL (ref 79–97)
MONOCYTES # BLD AUTO: 1.32 10*3/MM3 (ref 0.1–0.9)
MONOCYTES NFR BLD AUTO: 7.9 % (ref 5–12)
NEUTROPHILS NFR BLD AUTO: 10.86 10*3/MM3 (ref 1.7–7)
NEUTROPHILS NFR BLD AUTO: 64.9 % (ref 42.7–76)
NRBC BLD AUTO-RTO: 0 /100 WBC (ref 0–0.2)
PLATELET # BLD AUTO: 362 10*3/MM3 (ref 140–450)
PMV BLD AUTO: 10.6 FL (ref 6–12)
POTASSIUM SERPL-SCNC: 3.6 MMOL/L (ref 3.5–5.2)
PROT SERPL-MCNC: 8 G/DL (ref 6–8.5)
RBC # BLD AUTO: 4.98 10*6/MM3 (ref 4.14–5.8)
S PYO AG THROAT QL: NEGATIVE
SARS-COV-2 RNA RESP QL NAA+PROBE: NOT DETECTED
SODIUM SERPL-SCNC: 140 MMOL/L (ref 136–145)
WBC NRBC COR # BLD AUTO: 16.74 10*3/MM3 (ref 3.4–10.8)
WHOLE BLOOD HOLD COAG: NORMAL
WHOLE BLOOD HOLD SPECIMEN: NORMAL

## 2025-02-08 PROCEDURE — 63710000001 DEXAMETHASONE PER 0.25 MG

## 2025-02-08 PROCEDURE — 87081 CULTURE SCREEN ONLY: CPT | Performed by: PHYSICIAN ASSISTANT

## 2025-02-08 PROCEDURE — 87636 SARSCOV2 & INF A&B AMP PRB: CPT | Performed by: PHYSICIAN ASSISTANT

## 2025-02-08 PROCEDURE — 99283 EMERGENCY DEPT VISIT LOW MDM: CPT

## 2025-02-08 PROCEDURE — 80053 COMPREHEN METABOLIC PANEL: CPT | Performed by: PHYSICIAN ASSISTANT

## 2025-02-08 PROCEDURE — 85025 COMPLETE CBC W/AUTO DIFF WBC: CPT | Performed by: PHYSICIAN ASSISTANT

## 2025-02-08 PROCEDURE — 36415 COLL VENOUS BLD VENIPUNCTURE: CPT

## 2025-02-08 PROCEDURE — 87880 STREP A ASSAY W/OPTIC: CPT | Performed by: PHYSICIAN ASSISTANT

## 2025-02-08 RX ORDER — DEXAMETHASONE 4 MG/1
8 TABLET ORAL ONCE
Status: COMPLETED | OUTPATIENT
Start: 2025-02-08 | End: 2025-02-08

## 2025-02-08 RX ADMIN — DEXAMETHASONE 8 MG: 4 TABLET ORAL at 20:58

## 2025-02-09 NOTE — ED PROVIDER NOTES
Subjective   History of Present Illness  Presents today with a few day history of sore throat, runny nose and cough and some ear ache.  The patient started to lose his voice progressively during today.  The patient has a mild cough with occasional phlegm production.  Otherwise, the patient denies any other cardiorespiratory or URTI symptoms.      Review of Systems   Constitutional:  Negative for chills, diaphoresis and fever.   HENT:  Positive for congestion, ear pain, rhinorrhea, sore throat and voice change. Negative for sinus pressure.    Respiratory:  Positive for cough. Negative for shortness of breath, wheezing and stridor.    Cardiovascular:  Negative for chest pain and palpitations.   Gastrointestinal:  Positive for nausea and vomiting (intermittent, chronic, being investigated for upper GI issues). Negative for abdominal distention, abdominal pain, constipation and diarrhea.   Skin: Negative.    Neurological: Negative.        Past Medical History:   Diagnosis Date    ADHD (attention deficit hyperactivity disorder)     Anxiety     Hepatitis C     Substance abuse     Withdrawal symptoms, drug or narcotic        No Known Allergies    Past Surgical History:   Procedure Laterality Date    NO PAST SURGERIES         Family History   Problem Relation Age of Onset    Drug abuse Paternal Uncle        Social History     Socioeconomic History    Marital status: Single   Tobacco Use    Smoking status: Former     Current packs/day: 2.00     Average packs/day: 2.0 packs/day for 8.0 years (16.0 ttl pk-yrs)     Types: Cigarettes     Passive exposure: Past    Smokeless tobacco: Never   Vaping Use    Vaping status: Every Day    Substances: Nicotine    Devices: Refillable tank   Substance and Sexual Activity    Alcohol use: No    Drug use: Yes     Types: Methamphetamines, Opium     Comment: suboxone    Sexual activity: Yes     Partners: Female     Birth control/protection: Condom           Objective   Physical Exam  Vitals and  nursing note reviewed.   Constitutional:       General: He is not in acute distress.     Appearance: He is normal weight. He is ill-appearing. He is not toxic-appearing or diaphoretic.   HENT:      Head: Normocephalic and atraumatic.      Jaw: No trismus, tenderness, pain on movement or malocclusion.      Nose: Nose normal. No nasal deformity, mucosal edema, congestion or rhinorrhea.      Mouth/Throat:      Lips: Pink. No lesions.      Mouth: Mucous membranes are moist. No oral lesions or angioedema.      Tongue: No lesions.      Palate: No mass and lesions.      Pharynx: Oropharynx is clear. No oropharyngeal exudate or posterior oropharyngeal erythema.      Tonsils: No tonsillar exudate or tonsillar abscesses. 0 on the right. 0 on the left.   Eyes:      General:         Right eye: No discharge.         Left eye: No discharge.      Extraocular Movements: Extraocular movements intact.      Conjunctiva/sclera: Conjunctivae normal.      Pupils: Pupils are equal, round, and reactive to light.   Cardiovascular:      Rate and Rhythm: Normal rate and regular rhythm.      Heart sounds: Normal heart sounds. No murmur heard.     No friction rub. No gallop.   Pulmonary:      Effort: Pulmonary effort is normal. No respiratory distress.      Breath sounds: Normal breath sounds. No stridor.   Skin:     Coloration: Skin is not jaundiced.      Findings: No bruising, erythema or rash.   Neurological:      General: No focal deficit present.      Mental Status: He is alert and oriented to person, place, and time. Mental status is at baseline.      Cranial Nerves: No cranial nerve deficit.   Psychiatric:         Behavior: Behavior normal.         Thought Content: Thought content normal.         Judgment: Judgment normal.         Procedures           ED Course  ED Course as of 02/08/25 2039   Sat Feb 08, 2025 2030 WBC(!): 16.74 [RA]   2030 Glucose(!): 100 [RA]   2030 Strep A Ag: Negative [RA]   2030 COVID-19 and FLU A/B PCR, 1 HR TAT  - Swab, Nasopharynx [RA]      ED Course User Index  [RA] César Villatoro MD                                                       Medical Decision Making  Presents today with classic symptoms of URTI.  Differential for the constellation of symptoms is viral URTI, strep throat, mononucleosis, pneumonia, pharyngitis, croup    Lab work and swabs demonstrate the patient is negative for, viral illnesses and strep.    The patient has a clinical diagnosis of pharyngitis and will be treated as such in outpatient setting    Amount and/or Complexity of Data Reviewed  Labs: ordered. Decision-making details documented in ED Course.        Final diagnoses:   Pharyngitis, unspecified etiology       ED Disposition  ED Disposition       ED Disposition   Discharge    Condition   Stable    Comment   --               Provider, No Known  [facility]  [place] [address]  [phone]    Schedule an appointment as soon as possible for a visit in 3 days           Medication List      No changes were made to your prescriptions during this visit.            César Villatoro MD  02/08/25 [id number]

## 2025-02-09 NOTE — DISCHARGE INSTRUCTIONS
Patient is arrange an appoint with PCP over the coming days for reassessment/evaluation.  It will also provide opportunity to discuss further symptomatic treatments.    The patient has been administered a dose of dexamethasone in the emergency department but otherwise should treat his symptoms symptomatically.  To this end, I have given the patient instructions/information for managing pharyngitis and sore throat.  Patient to return to the emergency department if he has any worsening of his clinical symptoms over the course of the next 24 to 48 hours.

## 2025-02-10 LAB — BACTERIA SPEC AEROBE CULT: NORMAL
